# Patient Record
Sex: FEMALE | Race: WHITE | NOT HISPANIC OR LATINO | Employment: OTHER | URBAN - METROPOLITAN AREA
[De-identification: names, ages, dates, MRNs, and addresses within clinical notes are randomized per-mention and may not be internally consistent; named-entity substitution may affect disease eponyms.]

---

## 2024-02-29 ENCOUNTER — HOSPITAL ENCOUNTER (EMERGENCY)
Facility: HOSPITAL | Age: 79
Discharge: HOME | End: 2024-02-29
Attending: EMERGENCY MEDICINE
Payer: MEDICARE

## 2024-02-29 ENCOUNTER — APPOINTMENT (OUTPATIENT)
Dept: RADIOLOGY | Facility: HOSPITAL | Age: 79
End: 2024-02-29
Payer: MEDICARE

## 2024-02-29 VITALS
RESPIRATION RATE: 15 BRPM | BODY MASS INDEX: 25.69 KG/M2 | OXYGEN SATURATION: 96 % | DIASTOLIC BLOOD PRESSURE: 59 MMHG | WEIGHT: 145 LBS | TEMPERATURE: 98.7 F | SYSTOLIC BLOOD PRESSURE: 118 MMHG | HEIGHT: 63 IN | HEART RATE: 93 BPM

## 2024-02-29 DIAGNOSIS — R60.9 PERIPHERAL EDEMA: Primary | ICD-10-CM

## 2024-02-29 DIAGNOSIS — R60.0 PERIPHERAL EDEMA: Primary | ICD-10-CM

## 2024-02-29 LAB
ALBUMIN SERPL BCP-MCNC: 3.6 G/DL (ref 3.4–5)
ALP SERPL-CCNC: 138 U/L (ref 33–136)
ALT SERPL W P-5'-P-CCNC: 21 U/L (ref 7–45)
ANION GAP SERPL CALC-SCNC: 13 MMOL/L (ref 10–20)
AST SERPL W P-5'-P-CCNC: 19 U/L (ref 9–39)
BASOPHILS # BLD AUTO: 0.03 X10*3/UL (ref 0–0.1)
BASOPHILS NFR BLD AUTO: 0.4 %
BILIRUB DIRECT SERPL-MCNC: 0.1 MG/DL (ref 0–0.3)
BILIRUB SERPL-MCNC: 0.3 MG/DL (ref 0–1.2)
BNP SERPL-MCNC: 150 PG/ML (ref 0–99)
BUN SERPL-MCNC: 26 MG/DL (ref 6–23)
CALCIUM SERPL-MCNC: 9.1 MG/DL (ref 8.6–10.3)
CHLORIDE SERPL-SCNC: 106 MMOL/L (ref 98–107)
CO2 SERPL-SCNC: 26 MMOL/L (ref 21–32)
CREAT SERPL-MCNC: 0.97 MG/DL (ref 0.5–1.05)
EGFRCR SERPLBLD CKD-EPI 2021: 60 ML/MIN/1.73M*2
EOSINOPHIL # BLD AUTO: 0.17 X10*3/UL (ref 0–0.4)
EOSINOPHIL NFR BLD AUTO: 2.3 %
ERYTHROCYTE [DISTWIDTH] IN BLOOD BY AUTOMATED COUNT: 14 % (ref 11.5–14.5)
GLUCOSE SERPL-MCNC: 95 MG/DL (ref 74–99)
HCT VFR BLD AUTO: 38.2 % (ref 36–46)
HGB BLD-MCNC: 12.1 G/DL (ref 12–16)
IMM GRANULOCYTES # BLD AUTO: 0.01 X10*3/UL (ref 0–0.5)
IMM GRANULOCYTES NFR BLD AUTO: 0.1 % (ref 0–0.9)
INR PPP: 1.2 (ref 0.9–1.1)
LYMPHOCYTES # BLD AUTO: 2.58 X10*3/UL (ref 0.8–3)
LYMPHOCYTES NFR BLD AUTO: 35.2 %
MCH RBC QN AUTO: 29.1 PG (ref 26–34)
MCHC RBC AUTO-ENTMCNC: 31.7 G/DL (ref 32–36)
MCV RBC AUTO: 92 FL (ref 80–100)
MONOCYTES # BLD AUTO: 0.55 X10*3/UL (ref 0.05–0.8)
MONOCYTES NFR BLD AUTO: 7.5 %
NEUTROPHILS # BLD AUTO: 3.99 X10*3/UL (ref 1.6–5.5)
NEUTROPHILS NFR BLD AUTO: 54.5 %
NRBC BLD-RTO: 0 /100 WBCS (ref 0–0)
PLATELET # BLD AUTO: 308 X10*3/UL (ref 150–450)
POTASSIUM SERPL-SCNC: 4.4 MMOL/L (ref 3.5–5.3)
PROT SERPL-MCNC: 6.8 G/DL (ref 6.4–8.2)
PROTHROMBIN TIME: 13.8 SECONDS (ref 9.8–12.8)
RBC # BLD AUTO: 4.16 X10*6/UL (ref 4–5.2)
SODIUM SERPL-SCNC: 141 MMOL/L (ref 136–145)
WBC # BLD AUTO: 7.3 X10*3/UL (ref 4.4–11.3)

## 2024-02-29 PROCEDURE — 36415 COLL VENOUS BLD VENIPUNCTURE: CPT | Performed by: EMERGENCY MEDICINE

## 2024-02-29 PROCEDURE — 93971 EXTREMITY STUDY: CPT | Performed by: RADIOLOGY

## 2024-02-29 PROCEDURE — 93971 EXTREMITY STUDY: CPT

## 2024-02-29 PROCEDURE — 99284 EMERGENCY DEPT VISIT MOD MDM: CPT | Mod: 25

## 2024-02-29 PROCEDURE — 85610 PROTHROMBIN TIME: CPT | Performed by: EMERGENCY MEDICINE

## 2024-02-29 PROCEDURE — 82248 BILIRUBIN DIRECT: CPT | Performed by: EMERGENCY MEDICINE

## 2024-02-29 PROCEDURE — 85025 COMPLETE CBC W/AUTO DIFF WBC: CPT | Performed by: EMERGENCY MEDICINE

## 2024-02-29 PROCEDURE — 83880 ASSAY OF NATRIURETIC PEPTIDE: CPT | Performed by: EMERGENCY MEDICINE

## 2024-02-29 ASSESSMENT — COLUMBIA-SUICIDE SEVERITY RATING SCALE - C-SSRS
1. IN THE PAST MONTH, HAVE YOU WISHED YOU WERE DEAD OR WISHED YOU COULD GO TO SLEEP AND NOT WAKE UP?: NO
6. HAVE YOU EVER DONE ANYTHING, STARTED TO DO ANYTHING, OR PREPARED TO DO ANYTHING TO END YOUR LIFE?: NO
2. HAVE YOU ACTUALLY HAD ANY THOUGHTS OF KILLING YOURSELF?: NO

## 2024-02-29 ASSESSMENT — PAIN - FUNCTIONAL ASSESSMENT: PAIN_FUNCTIONAL_ASSESSMENT: 0-10

## 2024-02-29 ASSESSMENT — ACTIVITIES OF DAILY LIVING (ADL): LACK_OF_TRANSPORTATION: NO

## 2024-02-29 NOTE — PROGRESS NOTES
Transitional Care Coordination Progress Note:  Plan per Medical/Surgical team: treatment of leg swelling with US of leg swelling  Status: ED  Payor source: medicare A/B  Discharge disposition: With  @ [facility] in [place], private Aide M/W/F 5 hours a day  PCP Dr Marquez in [place]  Potential Barriers: hx of CVA-uses electric WC  ADOD: 2/29/2024  LORELEI Bañuelos RN, BSN Transitional Care Coordinator ED# [phone]      02/29/24 1136   Discharge Planning   Living Arrangements Spouse/significant other   Support Systems Spouse/significant other;Children   Assistance Needed US of leg pending   Type of Residence Assisted living   Do you have animals or pets at home? No   Care Facility Name With  @ [facility] in [place], private Aide M/W/F 5 hours a day   Home or Post Acute Services In home services   Type of Post Acute Facility Services Assisted living   Type of Home Care Services Home health aide   Patient expects to be discharged to: With  @ [facility] in [place], private Aide M/W/F 5 hours a day   Does the patient need discharge transport arranged? Yes   RoundTrip coordination needed? Yes   Has discharge transport been arranged? No   Financial Resource Strain   How hard is it for you to pay for the very basics like food, housing, medical care, and heating? Not hard   Housing Stability   In the last 12 months, was there a time when you were not able to pay the mortgage or rent on time? N   In the last 12 months, how many places have you lived? 2   In the last 12 months, was there a time when you did not have a steady place to sleep or slept in a shelter (including now)? N   Transportation Needs   In the past 12 months, has lack of transportation kept you from medical appointments or from getting medications? no   In the past 12 months, has lack of transportation kept you from meetings, work, or from getting things needed for daily living? No

## 2024-02-29 NOTE — CARE PLAN
Pt  said she  did not  know  how  she   was    going  to    get   home.     SW  spoke with pt - she  said that her   was in another  room in the ED and had  the cell phone/phone  numbers. SW went to spouse - he  was  dc. He  was  accompanied by this   writer  to the pt's   room   where  she is  dc. Pt and spouse  agreed that they  can    call family  to  get  a  ride.     Trixie GRNAT

## 2024-02-29 NOTE — ED PROVIDER NOTES
HPI   Chief Complaint   Patient presents with    Leg Pain       HPI: []  78-year-old white female history of hypertension, CVA with left-sided weakness on Coumadin comes in with left leg swelling.  She states he wears a brace she does ambulate with a walker and also an electric wheelchair and for the last few days she noticed left leg swelling.  No trauma no falls.  No chest pain pressure heaviness or trouble breathing.  No hip pain or knee pain.  She denies recent travel hospitalization or antibiotics.  She denies any abdominal pain nausea diarrhea fever chills cough congestion incontinence seizures syncope or near syncope.    Past history: Hypertension, CVA, CAD, breast cancer in remission  Social: Patient denies current tobacco alcohol drug abuse.  REVIEW OF SYSTEMS:    GENERAL.: No weight loss, fatigue, anorexia, insomnia, fever.    EYES: No vision loss, double vision, drainage, eye pain.    ENT: No pharyngitis, dry mouth.    CARDIOPULMONARY: No chest pain, palpitations, syncope, near syncope. No shortness of breath, cough, hemoptysis.    GI: No abdominal pain, change in bowel habits, melena, hematemesis, hematochezia, nausea, vomiting, diarrhea.    : No discharge, dysuria, frequency, urgency, hematuria.    MS: No limb pain, joint pain, joint swelling.  Positive left lower extremity swelling    SKIN: No rashes.    PSYCH: No depression, anxiety, suicidality, homicidality.    Review of systems is otherwise negative unless stated above or in history of present illness.  Social history, family history, allergies reviewed.  PHYSICAL EXAM:    GENERAL: Vitals noted, no distress. Alert and oriented  x 3. Non-toxic.      EENT: TMs clear. Posterior oropharynx unremarkable. No meningismus. No LAD.     NECK: Supple. Nontender. No midline tenderness.     CARDIAC: Regular, rate, rhythm.  Grade 2 x 6 ejection systolic murmur best at the left lower sternal border, no rubs or gallops. No JVD    PULMONARY: Lungs clear  bilaterally with good aeration. No wheezes rales or rhonchi. No respiratory distress.  No tachypnea stridor or retractions able to speak in full sentences    ABDOMEN: Soft, nonsurgical. Nontender. No peritoneal signs. Normoactive bowel sounds. No pulsatile masses.     EXTREMITIES: Right lower extremity has no peripheral edema. Negative Homans bilaterally, no cords.  2+ bounding pulses well-perfused, left lower extremity was immobilized in a splint brace which I took off patient has 1+ pitting edema 2+ bounding pulses no obvious deformity no swelling of the knee joint or ankle joint good range of motion of the hip knee and ankle bounding pulses neurovascular intact no erythema redness ecchymosis or bruising.    SKIN: No rash. Intact.     NEURO: Patient at her baseline neurologic status with no new focal neurologic deficits.    MEDICAL DECISION MAKING:  CBC shows no leukocytosis stable hemoglobin chemistry LFTs are unremarkable INR 1.2 subtherapeutic ultrasound left lower extremity negative for DVT.    Treatment in ED: None    ED course: Patient remains asymptomatic remains afebrile normotensive no tachycardia or hypoxia.    Impression: #1 left lower extremity swelling  MDM/plan: Elderly female 78-year-old history of CVA in the past on Coumadin presents with atraumatic left lower remedy swelling she does have asymmetric edema 1+ pitting edema left lower extremity bounding pulses low concern for an cellulitis neurovascular compromise joint effusion or arthritis septic arthritis or DVT which have been ruled out low concern for compartment syndrome or necrotizing fasciitis, patient be discharged home advised supportive care compression stockings leg elevation made aware of subtherapeutic INR advised to reach out to her primary doctor for further advice regarding Coumadin dosage with strict return precaution.                          [place] Coma Scale Score: 15                     Patient History   No past medical  history on file.  No past surgical history on file.  No family history on file.  Social History     Tobacco Use    Smoking status: Not on file    Smokeless tobacco: Not on file   Substance Use Topics    Alcohol use: Not on file    Drug use: Not on file       Physical Exam   ED Triage Vitals   Temperature Heart Rate Respirations BP   02/29/24 1114 02/29/24 1114 02/29/24 1114 02/29/24 1114   37.1 °C (98.7 °F) 96 18 138/56      Pulse Ox Temp Source Heart Rate Source Patient Position   02/29/24 1114 02/29/24 1114 02/29/24 1400 --   96 % Temporal Monitor       BP Location FiO2 (%)     02/29/24 1114 --     Left arm        Physical Exam    ED Course & MDM   ED Course as of 02/29/24 1504   Thu Feb 29, 2024   1349 Patient CBC with differential is unremarkable no leukocytosis chemistries unremarkable,  ultrasound lower extremity negative DVT.  On my exam patient is comfortable afebrile normotensive left lower extremity has 1+ pitting edema but soft compartments good range of motion of the knee ankle and hip low concern for compartment syndrome and or hematoma and/or infection or cellulitis or necrotizing fasciitis, patient be discharged home with supportive care advised to wear compression stockings leg elevation her INR is low, patient made aware advised to reach out to primary doctor for further advice with strict return precautions. [MT]      ED Course User Index  [MT] Hilton Pinon MD         Diagnoses as of 02/29/24 1504   Peripheral edema       Medical Decision Making      Procedure  Procedures     Hilton Pinon MD  02/29/24 1508

## 2024-02-29 NOTE — PROGRESS NOTES
With  @ [facility] Aide M/W/F 5 hours a day     02/29/24 1131   Current Planned Discharge Disposition   Current Planned Discharge Disposition Home

## 2024-02-29 NOTE — PROGRESS NOTES
02/29/24 1131   ACS Disability Status   Are you deaf or do you have serious difficulty hearing? N   Are you blind or do you have serious difficulty seeing, even when wearing glasses? N   Because of a physical, mental, or emotional condition, do you have serious difficulty concentrating, remembering, or making decisions? (5 years old or older) N   Do you have serious difficulty walking or climbing stairs? Y  (CVA, left leg brace, walker, electric WC)   Do you have serious difficulty dressing or bathing? N   Because of a physical, mental, or emotional condition, do you have serious difficulty doing errands alone such as visiting the doctor? Y  (aide drives couple to appointments)

## 2024-02-29 NOTE — ED TRIAGE NOTES
Pt arrived via EMS from [facility] with chief c/o of L LE pain/redness/swelling x3 days. Pt denies any known injury to site. EMS does report that they helped her off the floor a few weeks ago but they are unsure if they had any cuts. Pt has hx of stroke with residual aphasia. She states she uses mechanical wheelchair at home.

## 2024-07-03 ENCOUNTER — HOSPITAL ENCOUNTER (OUTPATIENT)
Facility: HOSPITAL | Age: 79
Setting detail: OBSERVATION
Discharge: HOME | End: 2024-07-04
Attending: EMERGENCY MEDICINE | Admitting: INTERNAL MEDICINE
Payer: MEDICARE

## 2024-07-03 DIAGNOSIS — S01.81XA LACERATION OF FOREHEAD, INITIAL ENCOUNTER: ICD-10-CM
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
## 2024-07-03 DIAGNOSIS — F01.50 VASCULAR DEMENTIA, UNSPECIFIED DEMENTIA SEVERITY, UNSPECIFIED WHETHER BEHAVIORAL, PSYCHOTIC, OR MOOD DISTURBANCE OR ANXIETY (MULTI): ICD-10-CM

## 2024-07-03 DIAGNOSIS — S09.90XA HEAD INJURY, INITIAL ENCOUNTER: ICD-10-CM

## 2024-07-03 PROCEDURE — 99285 EMERGENCY DEPT VISIT HI MDM: CPT | Mod: 25

## 2024-07-03 ASSESSMENT — PAIN DESCRIPTION - LOCATION: LOCATION: HEAD

## 2024-07-03 ASSESSMENT — PAIN - FUNCTIONAL ASSESSMENT: PAIN_FUNCTIONAL_ASSESSMENT: 0-10

## 2024-07-03 ASSESSMENT — PAIN DESCRIPTION - PROGRESSION: CLINICAL_PROGRESSION: NOT CHANGED

## 2024-07-03 ASSESSMENT — PAIN DESCRIPTION - PAIN TYPE: TYPE: ACUTE PAIN

## 2024-07-03 ASSESSMENT — PAIN SCALES - GENERAL: PAINLEVEL_OUTOF10: 3

## 2024-07-04 ENCOUNTER — APPOINTMENT (OUTPATIENT)
Dept: RADIOLOGY | Facility: HOSPITAL | Age: 79
End: 2024-07-04
Payer: MEDICARE

## 2024-07-04 VITALS
RESPIRATION RATE: 16 BRPM | OXYGEN SATURATION: 95 % | BODY MASS INDEX: 22.32 KG/M2 | HEIGHT: 63 IN | HEART RATE: 85 BPM | WEIGHT: 126 LBS | SYSTOLIC BLOOD PRESSURE: 142 MMHG | DIASTOLIC BLOOD PRESSURE: 81 MMHG | TEMPERATURE: 98.1 F

## 2024-07-04 PROBLEM — I35.0 AORTIC STENOSIS: Status: ACTIVE | Noted: 2024-07-04

## 2024-07-04 PROBLEM — Z86.73 HISTORY OF STROKE: Status: ACTIVE | Noted: 2024-07-04

## 2024-07-04 PROBLEM — G40.909 SEIZURE DISORDER (MULTI): Status: ACTIVE | Noted: 2024-07-04

## 2024-07-04 PROBLEM — R47.01 EXPRESSIVE APHASIA: Status: ACTIVE | Noted: 2024-07-04

## 2024-07-04 PROBLEM — Z86.718 HISTORY OF DVT (DEEP VEIN THROMBOSIS): Status: ACTIVE | Noted: 2024-07-04

## 2024-07-04 PROBLEM — F01.50 VASCULAR DEMENTIA (MULTI): Status: ACTIVE | Noted: 2024-07-04

## 2024-07-04 PROBLEM — Z86.79 HISTORY OF SUBDURAL HEMATOMA: Status: ACTIVE | Noted: 2024-07-04

## 2024-07-04 PROBLEM — F31.9 BIPOLAR DISORDER (MULTI): Status: ACTIVE | Noted: 2024-07-04
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
## 2024-07-04 PROBLEM — I73.9 PAD (PERIPHERAL ARTERY DISEASE) (CMS-HCC): Status: ACTIVE | Noted: 2024-07-04

## 2024-07-04 LAB
ALBUMIN SERPL BCP-MCNC: 3.7 G/DL (ref 3.4–5)
ALP SERPL-CCNC: 109 U/L (ref 33–136)
ALT SERPL W P-5'-P-CCNC: 20 U/L (ref 7–45)
ANION GAP SERPL CALC-SCNC: 15 MMOL/L (ref 10–20)
APPEARANCE UR: CLEAR
APTT PPP: 38 SECONDS (ref 27–38)
AST SERPL W P-5'-P-CCNC: 21 U/L (ref 9–39)
BASOPHILS # BLD AUTO: 0.03 X10*3/UL (ref 0–0.1)
BASOPHILS NFR BLD AUTO: 0.4 %
BILIRUB SERPL-MCNC: 0.2 MG/DL (ref 0–1.2)
BILIRUB UR STRIP.AUTO-MCNC: NEGATIVE MG/DL
BNP SERPL-MCNC: 100 PG/ML (ref 0–99)
BUN SERPL-MCNC: 21 MG/DL (ref 6–23)
CALCIUM SERPL-MCNC: 9.2 MG/DL (ref 8.6–10.3)
CHLORIDE SERPL-SCNC: 106 MMOL/L (ref 98–107)
CHOLEST SERPL-MCNC: 211 MG/DL (ref 0–199)
CHOLESTEROL/HDL RATIO: 4
CO2 SERPL-SCNC: 25 MMOL/L (ref 21–32)
COLOR UR: NORMAL
CREAT SERPL-MCNC: 0.94 MG/DL (ref 0.5–1.05)
EGFRCR SERPLBLD CKD-EPI 2021: 62 ML/MIN/1.73M*2
EOSINOPHIL # BLD AUTO: 0.11 X10*3/UL (ref 0–0.4)
EOSINOPHIL NFR BLD AUTO: 1.6 %
ERYTHROCYTE [DISTWIDTH] IN BLOOD BY AUTOMATED COUNT: 16.2 % (ref 11.5–14.5)
EST. AVERAGE GLUCOSE BLD GHB EST-MCNC: 108 MG/DL
GLUCOSE BLD MANUAL STRIP-MCNC: 104 MG/DL (ref 74–99)
GLUCOSE SERPL-MCNC: 118 MG/DL (ref 74–99)
GLUCOSE UR STRIP.AUTO-MCNC: NORMAL MG/DL
HBA1C MFR BLD: 5.4 %
HCT VFR BLD AUTO: 40.5 % (ref 36–46)
HDLC SERPL-MCNC: 52.6 MG/DL
HGB BLD-MCNC: 12.4 G/DL (ref 12–16)
IMM GRANULOCYTES # BLD AUTO: 0.02 X10*3/UL (ref 0–0.5)
IMM GRANULOCYTES NFR BLD AUTO: 0.3 % (ref 0–0.9)
INR PPP: 2.4 (ref 0.9–1.1)
KETONES UR STRIP.AUTO-MCNC: NEGATIVE MG/DL
LAMOTRIGINE SERPL-MCNC: 1.4 UG/ML (ref 2.5–15)
LDLC SERPL CALC-MCNC: 132 MG/DL
LEUKOCYTE ESTERASE UR QL STRIP.AUTO: NEGATIVE
LEVETIRACETAM SERPL-MCNC: <2 UG/ML (ref 10–40)
LYMPHOCYTES # BLD AUTO: 2.54 X10*3/UL (ref 0.8–3)
LYMPHOCYTES NFR BLD AUTO: 37.3 %
MCH RBC QN AUTO: 25.9 PG (ref 26–34)
MCHC RBC AUTO-ENTMCNC: 30.6 G/DL (ref 32–36)
MCV RBC AUTO: 85 FL (ref 80–100)
MONOCYTES # BLD AUTO: 0.67 X10*3/UL (ref 0.05–0.8)
MONOCYTES NFR BLD AUTO: 9.8 %
NEUTROPHILS # BLD AUTO: 3.44 X10*3/UL (ref 1.6–5.5)
NEUTROPHILS NFR BLD AUTO: 50.6 %
NITRITE UR QL STRIP.AUTO: NEGATIVE
NON HDL CHOLESTEROL: 158 MG/DL (ref 0–149)
NRBC BLD-RTO: 0 /100 WBCS (ref 0–0)
PH UR STRIP.AUTO: 7.5 [PH]
PLATELET # BLD AUTO: 287 X10*3/UL (ref 150–450)
POTASSIUM SERPL-SCNC: 4.2 MMOL/L (ref 3.5–5.3)
PROT SERPL-MCNC: 6.5 G/DL (ref 6.4–8.2)
PROT UR STRIP.AUTO-MCNC: NEGATIVE MG/DL
PROTHROMBIN TIME: 27.2 SECONDS (ref 9.8–12.8)
RBC # BLD AUTO: 4.79 X10*6/UL (ref 4–5.2)
RBC # UR STRIP.AUTO: NEGATIVE /UL
SODIUM SERPL-SCNC: 142 MMOL/L (ref 136–145)
SP GR UR STRIP.AUTO: 1.02
T4 FREE SERPL-MCNC: 0.85 NG/DL (ref 0.61–1.12)
TRIGL SERPL-MCNC: 131 MG/DL (ref 0–149)
TSH SERPL-ACNC: 2.68 MIU/L (ref 0.44–3.98)
UROBILINOGEN UR STRIP.AUTO-MCNC: NORMAL MG/DL
VLDL: 26 MG/DL (ref 0–40)
WBC # BLD AUTO: 6.8 X10*3/UL (ref 4.4–11.3)

## 2024-07-04 PROCEDURE — 85610 PROTHROMBIN TIME: CPT | Performed by: EMERGENCY MEDICINE

## 2024-07-04 PROCEDURE — 36415 COLL VENOUS BLD VENIPUNCTURE: CPT | Performed by: EMERGENCY MEDICINE

## 2024-07-04 PROCEDURE — 84443 ASSAY THYROID STIM HORMONE: CPT | Performed by: INTERNAL MEDICINE

## 2024-07-04 PROCEDURE — 70551 MRI BRAIN STEM W/O DYE: CPT | Performed by: STUDENT IN AN ORGANIZED HEALTH CARE EDUCATION/TRAINING PROGRAM

## 2024-07-04 PROCEDURE — 84439 ASSAY OF FREE THYROXINE: CPT | Performed by: INTERNAL MEDICINE

## 2024-07-04 PROCEDURE — 72125 CT NECK SPINE W/O DYE: CPT | Performed by: STUDENT IN AN ORGANIZED HEALTH CARE EDUCATION/TRAINING PROGRAM

## 2024-07-04 PROCEDURE — 70547 MR ANGIOGRAPHY NECK W/O DYE: CPT

## 2024-07-04 PROCEDURE — 72125 CT NECK SPINE W/O DYE: CPT

## 2024-07-04 PROCEDURE — 70544 MR ANGIOGRAPHY HEAD W/O DYE: CPT

## 2024-07-04 PROCEDURE — 80177 DRUG SCRN QUAN LEVETIRACETAM: CPT | Mod: AHULAB | Performed by: INTERNAL MEDICINE

## 2024-07-04 PROCEDURE — G0378 HOSPITAL OBSERVATION PER HR: HCPCS

## 2024-07-04 PROCEDURE — 73502 X-RAY EXAM HIP UNI 2-3 VIEWS: CPT | Mod: LT

## 2024-07-04 PROCEDURE — 80053 COMPREHEN METABOLIC PANEL: CPT | Performed by: EMERGENCY MEDICINE

## 2024-07-04 PROCEDURE — 83036 HEMOGLOBIN GLYCOSYLATED A1C: CPT | Mod: AHULAB | Performed by: INTERNAL MEDICINE

## 2024-07-04 PROCEDURE — 81003 URINALYSIS AUTO W/O SCOPE: CPT | Performed by: EMERGENCY MEDICINE

## 2024-07-04 PROCEDURE — 2500000004 HC RX 250 GENERAL PHARMACY W/ HCPCS (ALT 636 FOR OP/ED): Performed by: EMERGENCY MEDICINE

## 2024-07-04 PROCEDURE — 80061 LIPID PANEL: CPT | Performed by: INTERNAL MEDICINE

## 2024-07-04 PROCEDURE — 36415 COLL VENOUS BLD VENIPUNCTURE: CPT | Performed by: INTERNAL MEDICINE

## 2024-07-04 PROCEDURE — 80175 DRUG SCREEN QUAN LAMOTRIGINE: CPT | Mod: AHULAB | Performed by: INTERNAL MEDICINE

## 2024-07-04 PROCEDURE — 2500000001 HC RX 250 WO HCPCS SELF ADMINISTERED DRUGS (ALT 637 FOR MEDICARE OP): Performed by: EMERGENCY MEDICINE

## 2024-07-04 PROCEDURE — 90715 TDAP VACCINE 7 YRS/> IM: CPT | Performed by: EMERGENCY MEDICINE

## 2024-07-04 PROCEDURE — 70547 MR ANGIOGRAPHY NECK W/O DYE: CPT | Performed by: STUDENT IN AN ORGANIZED HEALTH CARE EDUCATION/TRAINING PROGRAM

## 2024-07-04 PROCEDURE — 70450 CT HEAD/BRAIN W/O DYE: CPT

## 2024-07-04 PROCEDURE — 70450 CT HEAD/BRAIN W/O DYE: CPT | Performed by: RADIOLOGY

## 2024-07-04 PROCEDURE — 70450 CT HEAD/BRAIN W/O DYE: CPT | Mod: 59

## 2024-07-04 PROCEDURE — 2500000001 HC RX 250 WO HCPCS SELF ADMINISTERED DRUGS (ALT 637 FOR MEDICARE OP): Performed by: INTERNAL MEDICINE

## 2024-07-04 PROCEDURE — 85025 COMPLETE CBC W/AUTO DIFF WBC: CPT | Performed by: EMERGENCY MEDICINE

## 2024-07-04 PROCEDURE — 82947 ASSAY GLUCOSE BLOOD QUANT: CPT

## 2024-07-04 PROCEDURE — 90471 IMMUNIZATION ADMIN: CPT | Performed by: EMERGENCY MEDICINE

## 2024-07-04 PROCEDURE — 70450 CT HEAD/BRAIN W/O DYE: CPT | Performed by: STUDENT IN AN ORGANIZED HEALTH CARE EDUCATION/TRAINING PROGRAM

## 2024-07-04 PROCEDURE — 70551 MRI BRAIN STEM W/O DYE: CPT

## 2024-07-04 PROCEDURE — 73502 X-RAY EXAM HIP UNI 2-3 VIEWS: CPT | Mod: LEFT SIDE | Performed by: STUDENT IN AN ORGANIZED HEALTH CARE EDUCATION/TRAINING PROGRAM

## 2024-07-04 PROCEDURE — 83880 ASSAY OF NATRIURETIC PEPTIDE: CPT | Performed by: INTERNAL MEDICINE

## 2024-07-04 PROCEDURE — 99235 HOSP IP/OBS SAME DATE MOD 70: CPT | Performed by: INTERNAL MEDICINE

## 2024-07-04 RX ORDER — ACETAMINOPHEN 325 MG/1
975 TABLET ORAL ONCE
Status: COMPLETED | OUTPATIENT
Start: 2024-07-04 | End: 2024-07-04

## 2024-07-04 RX ORDER — LEVETIRACETAM 250 MG/1
250 TABLET ORAL DAILY
COMMUNITY

## 2024-07-04 RX ORDER — DULOXETIN HYDROCHLORIDE 30 MG/1
30 CAPSULE, DELAYED RELEASE ORAL DAILY
COMMUNITY

## 2024-07-04 RX ORDER — ATORVASTATIN CALCIUM 40 MG/1
40 TABLET, FILM COATED ORAL NIGHTLY
Status: DISCONTINUED | OUTPATIENT
Start: 2024-07-04 | End: 2024-07-04 | Stop reason: HOSPADM

## 2024-07-04 RX ORDER — WARFARIN 2 MG/1
3 TABLET ORAL
COMMUNITY

## 2024-07-04 RX ORDER — LEVETIRACETAM 250 MG/1
250 TABLET ORAL DAILY
Status: DISCONTINUED | OUTPATIENT
Start: 2024-07-04 | End: 2024-07-04 | Stop reason: HOSPADM

## 2024-07-04 RX ORDER — ACETAMINOPHEN 325 MG/1
975 TABLET ORAL EVERY 8 HOURS PRN
Status: DISCONTINUED | OUTPATIENT
Start: 2024-07-04 | End: 2024-07-04 | Stop reason: HOSPADM

## 2024-07-04 RX ORDER — MIRTAZAPINE 15 MG/1
15 TABLET, FILM COATED ORAL NIGHTLY PRN
COMMUNITY

## 2024-07-04 RX ORDER — DULOXETIN HYDROCHLORIDE 30 MG/1
30 CAPSULE, DELAYED RELEASE ORAL DAILY
Status: DISCONTINUED | OUTPATIENT
Start: 2024-07-04 | End: 2024-07-04 | Stop reason: HOSPADM

## 2024-07-04 RX ORDER — ACETAMINOPHEN 160 MG/5ML
650 SOLUTION ORAL EVERY 6 HOURS PRN
Status: DISCONTINUED | OUTPATIENT
Start: 2024-07-04 | End: 2024-07-04 | Stop reason: HOSPADM

## 2024-07-04 RX ORDER — POLYETHYLENE GLYCOL 3350 17 G/17G
17 POWDER, FOR SOLUTION ORAL DAILY
Status: DISCONTINUED | OUTPATIENT
Start: 2024-07-04 | End: 2024-07-04 | Stop reason: HOSPADM

## 2024-07-04 RX ORDER — ONDANSETRON HYDROCHLORIDE 2 MG/ML
4 INJECTION, SOLUTION INTRAVENOUS EVERY 8 HOURS PRN
Status: DISCONTINUED | OUTPATIENT
Start: 2024-07-04 | End: 2024-07-04 | Stop reason: HOSPADM

## 2024-07-04 RX ORDER — ONDANSETRON 4 MG/1
4 TABLET, FILM COATED ORAL EVERY 8 HOURS PRN
Status: DISCONTINUED | OUTPATIENT
Start: 2024-07-04 | End: 2024-07-04 | Stop reason: HOSPADM

## 2024-07-04 RX ORDER — ACETAMINOPHEN 325 MG/1
650 TABLET ORAL EVERY 6 HOURS PRN
COMMUNITY

## 2024-07-04 RX ORDER — ATORVASTATIN CALCIUM 40 MG/1
40 TABLET, FILM COATED ORAL NIGHTLY
Qty: 30 TABLET | Refills: 0 | Status: SHIPPED | OUTPATIENT
Start: 2024-07-04 | End: 2024-08-03

## 2024-07-04 RX ORDER — LAMOTRIGINE 150 MG/1
150 TABLET ORAL DAILY
COMMUNITY

## 2024-07-04 RX ORDER — LABETALOL HYDROCHLORIDE 5 MG/ML
10 INJECTION, SOLUTION INTRAVENOUS EVERY 10 MIN PRN
Status: DISCONTINUED | OUTPATIENT
Start: 2024-07-04 | End: 2024-07-04 | Stop reason: HOSPADM

## 2024-07-04 RX ORDER — TALC
6 POWDER (GRAM) TOPICAL NIGHTLY
Status: DISCONTINUED | OUTPATIENT
Start: 2024-07-04 | End: 2024-07-04 | Stop reason: HOSPADM

## 2024-07-04 RX ORDER — WARFARIN 3 MG/1
3 TABLET ORAL
Status: DISCONTINUED | OUTPATIENT
Start: 2024-07-04 | End: 2024-07-04 | Stop reason: HOSPADM

## 2024-07-04 RX ORDER — WARFARIN 2 MG/1
2 TABLET ORAL 3 TIMES WEEKLY
COMMUNITY

## 2024-07-04 RX ORDER — ACETAMINOPHEN 650 MG/1
650 SUPPOSITORY RECTAL EVERY 4 HOURS PRN
Status: DISCONTINUED | OUTPATIENT
Start: 2024-07-04 | End: 2024-07-04 | Stop reason: HOSPADM

## 2024-07-04 RX ORDER — PANTOPRAZOLE SODIUM 40 MG/1
40 TABLET, DELAYED RELEASE ORAL
Status: DISCONTINUED | OUTPATIENT
Start: 2024-07-05 | End: 2024-07-04 | Stop reason: HOSPADM

## 2024-07-04 RX ORDER — WARFARIN 2 MG/1
2 TABLET ORAL DAILY
Status: DISCONTINUED | OUTPATIENT
Start: 2024-07-04 | End: 2024-07-04 | Stop reason: DRUGHIGH

## 2024-07-04 RX ORDER — ASPIRIN 81 MG/1
81 TABLET ORAL DAILY
Status: DISCONTINUED | OUTPATIENT
Start: 2024-07-04 | End: 2024-07-04 | Stop reason: HOSPADM

## 2024-07-04 RX ORDER — HYDRALAZINE HYDROCHLORIDE 25 MG/1
25 TABLET, FILM COATED ORAL EVERY 6 HOURS PRN
Status: DISCONTINUED | OUTPATIENT
Start: 2024-07-06 | End: 2024-07-04 | Stop reason: HOSPADM

## 2024-07-04 RX ORDER — WARFARIN 2 MG/1
2 TABLET ORAL
Status: DISCONTINUED | OUTPATIENT
Start: 2024-07-05 | End: 2024-07-04 | Stop reason: HOSPADM

## 2024-07-04 RX ORDER — MIRTAZAPINE 15 MG/1
15 TABLET, FILM COATED ORAL NIGHTLY
Status: DISCONTINUED | OUTPATIENT
Start: 2024-07-04 | End: 2024-07-04 | Stop reason: HOSPADM

## 2024-07-04 RX ORDER — HYDRALAZINE HYDROCHLORIDE 20 MG/ML
10 INJECTION INTRAMUSCULAR; INTRAVENOUS
Status: DISCONTINUED | OUTPATIENT
Start: 2024-07-04 | End: 2024-07-04 | Stop reason: HOSPADM

## 2024-07-04 SDOH — SOCIAL STABILITY: SOCIAL INSECURITY: DO YOU FEEL UNSAFE GOING BACK TO THE PLACE WHERE YOU ARE LIVING?: NO

## 2024-07-04 SDOH — SOCIAL STABILITY: SOCIAL INSECURITY: ARE YOU OR HAVE YOU BEEN THREATENED OR ABUSED PHYSICALLY, EMOTIONALLY, OR SEXUALLY BY ANYONE?: NO

## 2024-07-04 SDOH — SOCIAL STABILITY: SOCIAL INSECURITY: ABUSE: ADULT

## 2024-07-04 SDOH — SOCIAL STABILITY: SOCIAL INSECURITY: ARE THERE ANY APPARENT SIGNS OF INJURIES/BEHAVIORS THAT COULD BE RELATED TO ABUSE/NEGLECT?: NO

## 2024-07-04 SDOH — SOCIAL STABILITY: SOCIAL INSECURITY: DOES ANYONE TRY TO KEEP YOU FROM HAVING/CONTACTING OTHER FRIENDS OR DOING THINGS OUTSIDE YOUR HOME?: NO

## 2024-07-04 SDOH — SOCIAL STABILITY: SOCIAL INSECURITY: DO YOU FEEL ANYONE HAS EXPLOITED OR TAKEN ADVANTAGE OF YOU FINANCIALLY OR OF YOUR PERSONAL PROPERTY?: NO

## 2024-07-04 SDOH — SOCIAL STABILITY: SOCIAL INSECURITY: WERE YOU ABLE TO COMPLETE ALL THE BEHAVIORAL HEALTH SCREENINGS?: YES

## 2024-07-04 SDOH — SOCIAL STABILITY: SOCIAL INSECURITY: HAVE YOU HAD THOUGHTS OF HARMING ANYONE ELSE?: NO

## 2024-07-04 SDOH — SOCIAL STABILITY: SOCIAL INSECURITY: HAS ANYONE EVER THREATENED TO HURT YOUR FAMILY OR YOUR PETS?: NO

## 2024-07-04 SDOH — SOCIAL STABILITY: SOCIAL INSECURITY: HAVE YOU HAD ANY THOUGHTS OF HARMING ANYONE ELSE?: NO

## 2024-07-04 ASSESSMENT — PAIN DESCRIPTION - LOCATION: LOCATION: HIP

## 2024-07-04 ASSESSMENT — COGNITIVE AND FUNCTIONAL STATUS - GENERAL
TOILETING: A LOT
DAILY ACTIVITIY SCORE: 14
DRESSING REGULAR LOWER BODY CLOTHING: A LOT
MOVING FROM LYING ON BACK TO SITTING ON SIDE OF FLAT BED WITH BEDRAILS: A LOT
MOVING FROM LYING ON BACK TO SITTING ON SIDE OF FLAT BED WITH BEDRAILS: A LOT
HELP NEEDED FOR BATHING: A LOT
TURNING FROM BACK TO SIDE WHILE IN FLAT BAD: A LOT
MOVING TO AND FROM BED TO CHAIR: A LOT
WALKING IN HOSPITAL ROOM: TOTAL
PATIENT BASELINE BEDBOUND: NO
DRESSING REGULAR LOWER BODY CLOTHING: A LOT
STANDING UP FROM CHAIR USING ARMS: TOTAL
WALKING IN HOSPITAL ROOM: TOTAL
CLIMB 3 TO 5 STEPS WITH RAILING: TOTAL
DRESSING REGULAR UPPER BODY CLOTHING: A LOT
CLIMB 3 TO 5 STEPS WITH RAILING: TOTAL
TOILETING: A LOT
MOVING TO AND FROM BED TO CHAIR: A LOT
HELP NEEDED FOR BATHING: A LOT
MOBILITY SCORE: 9
PERSONAL GROOMING: A LOT
DRESSING REGULAR UPPER BODY CLOTHING: A LOT
DAILY ACTIVITIY SCORE: 14
STANDING UP FROM CHAIR USING ARMS: TOTAL
MOBILITY SCORE: 9
PERSONAL GROOMING: A LOT
TURNING FROM BACK TO SIDE WHILE IN FLAT BAD: A LOT

## 2024-07-04 ASSESSMENT — PAIN DESCRIPTION - ORIENTATION: ORIENTATION: LEFT

## 2024-07-04 ASSESSMENT — PAIN - FUNCTIONAL ASSESSMENT: PAIN_FUNCTIONAL_ASSESSMENT: 0-10

## 2024-07-04 ASSESSMENT — ACTIVITIES OF DAILY LIVING (ADL)
TOILETING: NEEDS ASSISTANCE
PATIENT'S MEMORY ADEQUATE TO SAFELY COMPLETE DAILY ACTIVITIES?: YES
FEEDING YOURSELF: INDEPENDENT
JUDGMENT_ADEQUATE_SAFELY_COMPLETE_DAILY_ACTIVITIES: YES
HEARING - RIGHT EAR: FUNCTIONAL
ASSISTIVE_DEVICE: WHEELCHAIR
LACK_OF_TRANSPORTATION: NO
DRESSING YOURSELF: NEEDS ASSISTANCE
HEARING - LEFT EAR: FUNCTIONAL
ADEQUATE_TO_COMPLETE_ADL: YES
BATHING: NEEDS ASSISTANCE
WALKS IN HOME: NEEDS ASSISTANCE
GROOMING: NEEDS ASSISTANCE

## 2024-07-04 ASSESSMENT — ENCOUNTER SYMPTOMS
CONSTITUTIONAL NEGATIVE: 1
CARDIOVASCULAR NEGATIVE: 1
MUSCULOSKELETAL NEGATIVE: 1
SPEECH DIFFICULTY: 1
RESPIRATORY NEGATIVE: 1
GASTROINTESTINAL NEGATIVE: 1
PSYCHIATRIC NEGATIVE: 1
HEADACHES: 1

## 2024-07-04 ASSESSMENT — PATIENT HEALTH QUESTIONNAIRE - PHQ9
1. LITTLE INTEREST OR PLEASURE IN DOING THINGS: NOT AT ALL
SUM OF ALL RESPONSES TO PHQ9 QUESTIONS 1 & 2: 0
2. FEELING DOWN, DEPRESSED OR HOPELESS: NOT AT ALL

## 2024-07-04 ASSESSMENT — PAIN SCALES - GENERAL
PAINLEVEL_OUTOF10: 0 - NO PAIN
PAINLEVEL_OUTOF10: 1
PAINLEVEL_OUTOF10: 0 - NO PAIN

## 2024-07-04 ASSESSMENT — LIFESTYLE VARIABLES
SKIP TO QUESTIONS 9-10: 1
HOW OFTEN DO YOU HAVE A DRINK CONTAINING ALCOHOL: NEVER
SUBSTANCE_ABUSE_PAST_12_MONTHS: NO
HOW MANY STANDARD DRINKS CONTAINING ALCOHOL DO YOU HAVE ON A TYPICAL DAY: PATIENT DOES NOT DRINK
AUDIT-C TOTAL SCORE: 0
PRESCIPTION_ABUSE_PAST_12_MONTHS: NO
AUDIT-C TOTAL SCORE: 0
HOW OFTEN DO YOU HAVE 6 OR MORE DRINKS ON ONE OCCASION: NEVER

## 2024-07-04 NOTE — PROGRESS NOTES
Emergency Medicine Transition of Care Note.    I received Joselyn Coronado in signout from Dr. Coleman.  Please see the previous ED provider note for all HPI, PE and MDM up to the time of signout at 0600. This is in addition to the primary record.    In brief Joselyn Coronado is an 79 y.o. female presenting for   Chief Complaint   Patient presents with    Fall     At the time of signout we were awaiting: Discharge    ED Course as of 07/04/24 1213   Thu Jul 04, 2024   0250 Reevaluated after signout, overall feeling improved, updated with imaging and lab results.  Patient states that normally she is in a motorized wheelchair.  Will discharge the patient home with outpatient wound care and return precautions. [LP]      ED Course User Index  [LP] Gregoria Coleman,          Diagnoses as of 07/04/24 1213   Fall, initial encounter   Head injury, initial encounter   Laceration of forehead, initial encounter       Medical Decision Making  This is a 79-year-old female who presents to the emergency department after a fall last night.  She was evaluated with labs and imaging.  These were unremarkable.  The patient was discharged and awaiting a ride home at time of signout.  Prior to leaving the emergency department, however, the patient developed an episode of slurred speech.  I was called to the bedside at 0 840.  The patient had slurred speech but an otherwise normal neurologic exam.  Blood sugar was performed and was 104.  Repeat head CT was performed and was unchanged.  Her slurred speech resolved after 10 to 15 minutes.  Due to this, the patient's discharge was canceled.  She was admitted to the observation unit for further evaluation and management.        Final diagnoses:   [W19.XXXA] Fall, initial encounter   [S09.90XA] Head injury, initial encounter   [S01.81XA] Laceration of forehead, initial encounter           Procedure  Procedures    Neal Mack MD

## 2024-07-04 NOTE — PROGRESS NOTES
I received Joselyn Coronado in signout from Dr. Mahmood.  Please see the previous note for all HPI, PE and MDM up to the time of signout at 0130.    In brief Joselyn Coronado is an 79 y.o. female presenting for   Chief Complaint   Patient presents with    Fall   .  At the time of signout we were awaiting: labs and reeval    ED Course as of 07/04/24 0251   Thu Jul 04, 2024   0250 Reevaluated after signout, overall feeling improved, updated with imaging and lab results.  Patient states that normally she is in a motorized wheelchair.  Will discharge the patient home with outpatient wound care and return precautions. [LP]      ED Course User Index  [LP] Gregoria Coleman DO         Diagnoses as of 07/04/24 0251   Fall, initial encounter   Head injury, initial encounter   Laceration of forehead, initial encounter       Pt Disposition: discharge    Procedures    Gregoria Coleman DO  Emergency Medicine  Medical Toxicology

## 2024-07-04 NOTE — PROGRESS NOTES
Pharmacy Medication History Review   Spoke to the patient, Pt stated she doesn't take Protonix anymore.  Pt is on Keppra and takes Warfarin daily, Went by the last INR schedule, pt wasn't sure.    Joselyn Coronado is a 79 y.o. female admitted for Expressive aphasia. Pharmacy reviewed the patient's [id number] medications and allergies for accuracy.    The list below reflectives the updated PTA list. Please review each medication in order reconciliation for additional clarification and justification.     Prior to Admission Medications   Prescriptions Last Dose Informant   DULoxetine (Cymbalta) 30 mg DR capsule 7/2/2024    Sig: Take 1 capsule (30 mg) by mouth once daily. Do not crush or chew.   lamoTRIgine (LaMICtal) 150 mg tablet 7/2/2024    Sig: Take 1 tablet (150 mg) by mouth once daily.   levETIRAcetam (Keppra) 250 mg tablet 7/2/2024    Sig: Take 1 tablet (250 mg) by mouth once daily.   mirtazapine (Remeron) 15 mg tablet     Sig: Take 1 tablet (15 mg) by mouth as needed at bedtime.   warfarin (Coumadin) 2 mg tablet     Sig: Take 1 tablet (2 mg) by mouth 3 times a week. Wed/ Fri/ Sat   warfarin (Coumadin) 2 mg tablet     Sig: Take 1.5 tablets (3 mg) by mouth 4 times a week. Sun/ Mon/ Tues/ Thurs      Facility-Administered Medications: None       The list below reflectives the updated allergy list. Please review each documented allergy for additional clarification and justification.  Allergies  Reviewed by Essence Mills RN on 7/3/2024        Severity Reactions Comments    Adhesive Low Rash Rash    Penicillins Low Rash             Below are additional concerns with the patient's PTA list.      Kelsey Yuen

## 2024-07-04 NOTE — NURSING NOTE
Patient arrived to the unit from the ED. Patient provided discharge folder and educated on the unit and fall prevention. Patient verbalized understanding.

## 2024-07-04 NOTE — ED TRIAGE NOTES
Patient states she was getting out of bed to use restroom when she fell and hit her head on the dresser. Patient on coumadin for previous stroke.

## 2024-07-04 NOTE — H&P
"History Of Present Illness  Joselyn Coronado is a 79 y.o. female presenting with fall.  Past Medical History:   Diagnosis Date    Aortic stenosis     Bipolar disorder (Multi)     History of DVT (deep vein thrombosis)     History of stroke     History of subdural hematoma     PAD (peripheral artery disease) (CMS-[facility])     Seizure disorder (Multi)     Vascular dementia (Multi)      When I saw Joselyn in ED, she was having expressive aphasia, mixing up her words, so that I was not able to get a clear story from her of why she was here. She did appear to be oriented. She had a mechanical fall at home last night, where her legs gave out, and she fell and hit her head (no LOC). She has an unsteady gait at baseline due to hx of stroke and femur fracture. She is on coumadin, INR 2.4 here. Work-up in ED including CT head was unremarkable. They were working to get her back home this morning, when she developed aphasia. She apparently has a history of expressive aphasia, but was not having it when she got to ED. She does not appear to be on asa or statin.      History reviewed. No pertinent surgical history.  Social History     Tobacco Use    Smoking status: Never    Smokeless tobacco: Never   Substance Use Topics    Alcohol use: Not Currently    Drug use: Not Currently     No family history on file.  Allergies  Adhesive and Penicillins    Review of Systems   Constitutional: Negative.    HENT: Negative.     Respiratory: Negative.     Cardiovascular: Negative.    Gastrointestinal: Negative.    Genitourinary: Negative.    Musculoskeletal: Negative.    Skin: Negative.    Neurological:  Positive for speech difficulty and headaches.   Psychiatric/Behavioral: Negative.         Last Recorded Vitals  Blood pressure 138/80, pulse 95, temperature 36.3 °C (97.3 °F), temperature source Temporal, resp. rate 16, height 1.6 m (5' 3\"), weight 57.2 kg (126 lb), SpO2 97%.  Physical Exam  Cardiovascular:      Rate and Rhythm: Normal rate and " regular rhythm.      Heart sounds: Murmur heard.      Systolic murmur is present.   Pulmonary:      Breath sounds: Normal breath sounds.   Abdominal:      General: Bowel sounds are normal.      Palpations: Abdomen is soft.   Musculoskeletal:         General: Normal range of motion.   Neurological:      General: No focal deficit present.      Mental Status: She is alert and oriented to person, place, and time.      Cranial Nerves: Cranial nerves 2-12 are intact.      Sensory: Sensation is intact.      Motor: Motor function is intact.      Comments: Expressive aphasia   Psychiatric:         Mood and Affect: Mood normal.           Relevant Results           Assessment/Plan   Principal Problem:    Expressive aphasia  Active Problems:    Seizure disorder (Multi)    History of subdural hematoma    History of stroke    History of DVT (deep vein thrombosis)    Bipolar disorder (Multi)    PAD (peripheral artery disease) (CMS-[facility])    Aortic stenosis    Vascular dementia (Multi)    Fall  - MRI brain is negative for stroke  - her aphasia subsequently resolved; it was likely related to her head injury and she gets this intermittently  - will add statin (should be on this anyway) and continue coumadin  - discharge home today         I spent 55 minutes in the professional and overall care of this patient.      Tono Canchola MD

## 2024-07-04 NOTE — CARE PLAN
The patient's goals for the shift include injury free    The clinical goals for the shift include free from falls    Over the shift, the patient did not make progress toward the following goals. Barriers to progression include weakness. Recommendations to address these barriers include increase safety.

## 2024-07-04 NOTE — ED PROVIDER NOTES
HPI   Chief Complaint   Patient presents with    Fall       HPI  Patient is a 79-year-old female who presented to the emergency room as a trauma activation after mechanical fall while thinners.  Patient states that she has a prior history of a CVA with a chronic left-sided upper and lower extremity weakness as well as a contralateral right femur fracture which makes her unsteady.  She states that her left lower extremity caved and she fell onto her dresser and nightstand.  She did strike her head but denies any loss of consciousness.  She has no neck pain, new neurologic deficits, nausea, vomiting, visual field cuts.  She has a chronic shortening of her right lower extremity secondary to her femur fracture and repair.  She has no hip pain at this time.  Denies any back pain or other issues.  She was not feeling any symptoms prior to the fall and was at her baseline.  She has been taking her Coumadin.  Of note, she has chronic lower extremity swelling on the left which has been evaluated by her primary care physician including ultrasounds that did not show signs of DVT according to patient.      PMHx: As above  PSHx: As above  FamilyHx: Denies pertinent denies  SocialHx:  Allergies: Adhesives and penicillin  Medications: See Medication Reconciliation     ROS  As above otherwise    Physical Exam    GENERAL: Awake and Alert, No Acute Distress  HEENT: Mild superficial abrasion overlying a cephalohematoma on the left side of her forehead.  AT/NC, PERRL, EOMI, Normal Oropharynx, No Signs of Dehydration  NECK: Normal Inspection, No JVD  CARDIOVASCULAR: RRR, No M/R/G  RESPIRATORY: CTA Bilaterally, No Wheezes, Rales or Rhonchi, Chest Wall Non-tender  ABDOMEN: Soft, non-tender abdomen, Normal Bowel Sounds, No Distention  BACK: No CVA Tenderness  SKIN: Normal Color, Warm, Dry, No Rashes   EXTREMITIES: Right lower extremity slightly shortened compared to left.  Non-Tender, Full ROM, No Pedal Edema  NEURO: A&O x 3, Normal Motor  and Sensation, Normal Mood and Affect    Nursing Assessment and Vitals Reviewed    EG showed a normal sinus rhythm at 97 bpm.  There are T wave inversions in lead III.  No ischemic ST changes.    Medical Decision  Patient is a 79-year-old female who presented to the emergency room as a trauma activation after mechanical fall while thinners.  Patient states that she has a prior history of a CVA with a chronic left-sided upper and lower extremity weakness as well as a contralateral right femur fracture which makes her unsteady.  She states that her left lower extremity caved and she fell onto her dresser and nightstand.  She did strike her head but denies any loss of consciousness.  She has no neck pain, new neurologic deficits, nausea, vomiting, visual field cuts.  She has a chronic shortening of her right lower extremity secondary to her femur fracture and repair.  She has no hip pain at this time.  Denies any back pain or other issues.  She was not feeling any symptoms prior to the fall and was at her baseline.  She has been taking her Coumadin.  Of note, she has chronic lower extremity swelling on the left which has been evaluated by her primary care physician including ultrasounds that did not show signs of DVT according to patient.    On evaluation patient is well-appearing and in no acute distress with an abrasion to the forehead as described above which is hemostatic.  She has baseline left upper and lower extremity weakness.  She has a shortening of her right lower extremity secondary to previous surgery.  She has no C, T, L-spine tenderness and although she is neurologically intact will obtain CT imaging given anticoagulations date.  Well awaiting her CT patient started to complain of left hip pain which she did not originally have.  As such x-ray is ordered.    Workup for patient thus far included a CBC that showed no anemia or leukocytosis.  Urinalysis is within normal limits.  X-ray of the hip showed no  evidence of acute traumatic injury.  CT of the head and C-spine showed no acute intracranial pathology, skull fracture.  She has findings consistent with prior CVA.  CT C-spine showing a lot of chronic findings but no acute pathology.  She is signed out to oncoming physician pending reevaluation, CMP, coagulation screen.  Patient does have a small superficial laceration to forehead which is repaired with glue with great approximation. [employer] updated.                               No data recorded                   Patient History   History reviewed. No pertinent past medical history.  History reviewed. No pertinent surgical history.  No family history on file.  Social History     Tobacco Use    Smoking status: Never    Smokeless tobacco: Never   Substance Use Topics    Alcohol use: Not on file    Drug use: Not on file       Physical Exam   ED Triage Vitals [07/03/24 2340]   Temperature Heart Rate Respirations BP   37.1 °C (98.8 °F) (!) 102 20 120/68      Pulse Ox Temp Source Heart Rate Source Patient Position   96 % Temporal Monitor Lying      BP Location FiO2 (%)     Right arm --       Physical Exam    ED Course & MDM   Diagnoses as of 07/04/24 0147   Fall, initial encounter   Head injury, initial encounter   Laceration of forehead, initial encounter       Medical Decision Making      Procedure  Procedures     Nancy Posada MD  07/04/24 0147       Nancy Posada MD  07/04/24 0158

## 2024-12-18 ENCOUNTER — APPOINTMENT (OUTPATIENT)
Dept: RADIOLOGY | Facility: HOSPITAL | Age: 79
End: 2024-12-18
Payer: MEDICARE

## 2024-12-18 ENCOUNTER — HOSPITAL ENCOUNTER (EMERGENCY)
Facility: HOSPITAL | Age: 79
Discharge: HOME | End: 2024-12-19
Attending: EMERGENCY MEDICINE
Payer: MEDICARE

## 2024-12-18 DIAGNOSIS — S09.90XA CLOSED HEAD INJURY, INITIAL ENCOUNTER: Primary | ICD-10-CM

## 2024-12-18 PROCEDURE — 73502 X-RAY EXAM HIP UNI 2-3 VIEWS: CPT | Mod: RIGHT SIDE | Performed by: RADIOLOGY

## 2024-12-18 PROCEDURE — 73502 X-RAY EXAM HIP UNI 2-3 VIEWS: CPT | Mod: RT

## 2024-12-18 PROCEDURE — 70450 CT HEAD/BRAIN W/O DYE: CPT

## 2024-12-18 PROCEDURE — 93970 EXTREMITY STUDY: CPT

## 2024-12-18 PROCEDURE — 73590 X-RAY EXAM OF LOWER LEG: CPT | Mod: RT

## 2024-12-18 PROCEDURE — 72125 CT NECK SPINE W/O DYE: CPT

## 2024-12-18 PROCEDURE — 73590 X-RAY EXAM OF LOWER LEG: CPT | Mod: RIGHT SIDE | Performed by: RADIOLOGY

## 2024-12-18 PROCEDURE — 72125 CT NECK SPINE W/O DYE: CPT | Performed by: SURGERY

## 2024-12-18 PROCEDURE — 70450 CT HEAD/BRAIN W/O DYE: CPT | Performed by: SURGERY

## 2024-12-18 PROCEDURE — 93971 EXTREMITY STUDY: CPT | Mod: FOREIGN READ | Performed by: RADIOLOGY

## 2024-12-18 PROCEDURE — 99285 EMERGENCY DEPT VISIT HI MDM: CPT | Mod: 25 | Performed by: EMERGENCY MEDICINE

## 2024-12-18 RX ORDER — ACETAMINOPHEN 325 MG/1
975 TABLET ORAL ONCE AS NEEDED
Status: DISCONTINUED | OUTPATIENT
Start: 2024-12-18 | End: 2024-12-19 | Stop reason: HOSPADM

## 2024-12-18 ASSESSMENT — PAIN SCALES - GENERAL: PAINLEVEL_OUTOF10: 0 - NO PAIN

## 2024-12-18 ASSESSMENT — PAIN - FUNCTIONAL ASSESSMENT: PAIN_FUNCTIONAL_ASSESSMENT: 0-10

## 2024-12-19 ENCOUNTER — APPOINTMENT (OUTPATIENT)
Dept: CARDIOLOGY | Facility: HOSPITAL | Age: 79
End: 2024-12-19
Payer: MEDICARE

## 2024-12-19 VITALS
HEART RATE: 98 BPM | TEMPERATURE: 98.4 F | DIASTOLIC BLOOD PRESSURE: 65 MMHG | SYSTOLIC BLOOD PRESSURE: 130 MMHG | OXYGEN SATURATION: 94 % | RESPIRATION RATE: 18 BRPM

## 2024-12-19 LAB
ATRIAL RATE: 95 BPM
GLUCOSE BLD MANUAL STRIP-MCNC: 108 MG/DL (ref 74–99)
P AXIS: 54 DEGREES
P OFFSET: 197 MS
P ONSET: 148 MS
PR INTERVAL: 148 MS
Q ONSET: 222 MS
QRS COUNT: 15 BEATS
QRS DURATION: 68 MS
QT INTERVAL: 382 MS
QTC CALCULATION(BAZETT): 480 MS
QTC FREDERICIA: 445 MS
R AXIS: 15 DEGREES
T AXIS: 64 DEGREES
T OFFSET: 413 MS
VENTRICULAR RATE: 95 BPM

## 2024-12-19 PROCEDURE — 93005 ELECTROCARDIOGRAM TRACING: CPT

## 2024-12-19 PROCEDURE — 82947 ASSAY GLUCOSE BLOOD QUANT: CPT

## 2024-12-19 NOTE — ED PROVIDER NOTES
HPI   Chief Complaint   Patient presents with    Fall       HPI  Patient has a history of stroke with visual loss and weakness.  Use a walker.  History of mechanical fall today fell hit her head on a dresser also her right buttock and right shin.  She notes is that with her left leg foot drop she has had some edema there for the last several weeks a month      Patient History   Past Medical History:   Diagnosis Date    Aortic stenosis     Bipolar disorder (Multi)     History of DVT (deep vein thrombosis)     History of stroke     History of subdural hematoma     PAD (peripheral artery disease) (CMS-[facility])     Seizure disorder (Multi)     Vascular dementia (Multi)      No past surgical history on file.  No family history on file.  Social History     Tobacco Use    Smoking status: Never    Smokeless tobacco: Never   Substance Use Topics    Alcohol use: Not Currently    Drug use: Not Currently       Physical Exam   ED Triage Vitals   Temperature Heart Rate Respirations BP   12/18/24 2058 12/18/24 2056 12/18/24 2056 12/18/24 2056   36.9 °C (98.4 °F) 92 20 131/73      Pulse Ox Temp Source Heart Rate Source Patient Position   12/18/24 2056 12/18/24 2058 12/18/24 2056 12/18/24 2056   (!) 92 % Temporal Monitor Sitting      BP Location FiO2 (%)     12/18/24 2056 --     Right arm        Physical Exam  Vitals and nursing note reviewed.   Constitutional:       General: She is not in acute distress.     Appearance: She is well-developed.   HENT:      Head: Normocephalic and atraumatic.   Eyes:      Conjunctiva/sclera: Conjunctivae normal.   Cardiovascular:      Rate and Rhythm: Normal rate and regular rhythm.      Heart sounds: No murmur heard.  Pulmonary:      Effort: Pulmonary effort is normal. No respiratory distress.      Breath sounds: Normal breath sounds.   Abdominal:      Palpations: Abdomen is soft.      Tenderness: There is no abdominal tenderness.   Musculoskeletal:         General: No swelling.      Cervical back: Neck  supple.      Left lower leg: Edema present.      Comments: Abrasion 1 cm to the anterior to the distal one third of the tibia   Skin:     General: Skin is warm and dry.      Capillary Refill: Capillary refill takes less than 2 seconds.   Neurological:      Mental Status: She is alert.      Motor: Weakness present.      Comments: Chronic left arm and leg weakness left foot drop   Psychiatric:         Mood and Affect: Mood normal.           ED Course & MDM   Diagnoses as of 12/19/24 0102   Closed head injury, initial encounter                 No data recorded     [employer] Coma Scale Score: 15 (12/18/24 2056 : Margaret Miranda, RN)                           Medical Decision Making  The patient has some increasing edema of her left leg ongoing for greater than 1 month.  Denies any chest pain shortness of breath.  Denies a fever, cough, vomiting.  Absence of any new systemic symptoms would hold off on blood work at this time.  If she has no bleeding in the brain or blood clot feel she be discharged home follow-up from PCP.  I CTs are negative.  Patient was oriented x 3.  Had some confusion after being woken up with the results of negative CT scans but has normal glucose and EKG unremarkable.    EKG interpreted by myself.  Normal sinus rhythm at a rate of 95 bpm.  Normal intervals.  Normal axis.  No signs of acute ischemia.    Procedure  Procedures     Francisco Johnson MD  12/19/24 0106

## 2024-12-19 NOTE — ED TRIAGE NOTES
Biba from home after a mechanical fall and hitting back of head on dresser. Pt denies LOC but endorses use of coumadin for stroke in 1995. Pt denies pain to head. Laceration 1/2in noted on right ankle from hitting area on bed.

## 2024-12-31 ENCOUNTER — APPOINTMENT (OUTPATIENT)
Dept: RADIOLOGY | Facility: HOSPITAL | Age: 79
End: 2024-12-31
Payer: MEDICARE

## 2024-12-31 ENCOUNTER — HOSPITAL ENCOUNTER (EMERGENCY)
Facility: HOSPITAL | Age: 79
Discharge: HOME | End: 2025-01-01
Attending: GENERAL PRACTICE
Payer: MEDICARE
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
## 2024-12-31 LAB
ALBUMIN SERPL BCP-MCNC: 3.8 G/DL (ref 3.4–5)
ALP SERPL-CCNC: 112 U/L (ref 33–136)
ALT SERPL W P-5'-P-CCNC: 39 U/L (ref 7–45)
ANION GAP SERPL CALC-SCNC: 12 MMOL/L (ref 10–20)
APPEARANCE UR: CLEAR
AST SERPL W P-5'-P-CCNC: 36 U/L (ref 9–39)
BASOPHILS # BLD AUTO: 0.05 X10*3/UL (ref 0–0.1)
BASOPHILS NFR BLD AUTO: 0.4 %
BILIRUB SERPL-MCNC: 0.6 MG/DL (ref 0–1.2)
BILIRUB UR STRIP.AUTO-MCNC: NEGATIVE MG/DL
BUN SERPL-MCNC: 16 MG/DL (ref 6–23)
CALCIUM SERPL-MCNC: 8.6 MG/DL (ref 8.6–10.3)
CHLORIDE SERPL-SCNC: 99 MMOL/L (ref 98–107)
CO2 SERPL-SCNC: 25 MMOL/L (ref 21–32)
COLOR UR: COLORLESS
CREAT SERPL-MCNC: 0.78 MG/DL (ref 0.5–1.05)
EGFRCR SERPLBLD CKD-EPI 2021: 78 ML/MIN/1.73M*2
EOSINOPHIL # BLD AUTO: 0.07 X10*3/UL (ref 0–0.4)
EOSINOPHIL NFR BLD AUTO: 0.6 %
ERYTHROCYTE [DISTWIDTH] IN BLOOD BY AUTOMATED COUNT: 14.9 % (ref 11.5–14.5)
GLUCOSE SERPL-MCNC: 95 MG/DL (ref 74–99)
GLUCOSE UR STRIP.AUTO-MCNC: NORMAL MG/DL
HCT VFR BLD AUTO: 38.1 % (ref 36–46)
HGB BLD-MCNC: 12.2 G/DL (ref 12–16)
IMM GRANULOCYTES # BLD AUTO: 0.05 X10*3/UL (ref 0–0.5)
IMM GRANULOCYTES NFR BLD AUTO: 0.4 % (ref 0–0.9)
KETONES UR STRIP.AUTO-MCNC: NEGATIVE MG/DL
LEUKOCYTE ESTERASE UR QL STRIP.AUTO: NEGATIVE
LYMPHOCYTES # BLD AUTO: 2.47 X10*3/UL (ref 0.8–3)
LYMPHOCYTES NFR BLD AUTO: 19.4 %
MAGNESIUM SERPL-MCNC: 1.78 MG/DL (ref 1.6–2.4)
MCH RBC QN AUTO: 26.9 PG (ref 26–34)
MCHC RBC AUTO-ENTMCNC: 32 G/DL (ref 32–36)
MCV RBC AUTO: 84 FL (ref 80–100)
MONOCYTES # BLD AUTO: 0.9 X10*3/UL (ref 0.05–0.8)
MONOCYTES NFR BLD AUTO: 7.1 %
NEUTROPHILS # BLD AUTO: 9.16 X10*3/UL (ref 1.6–5.5)
NEUTROPHILS NFR BLD AUTO: 72.1 %
NITRITE UR QL STRIP.AUTO: NEGATIVE
NRBC BLD-RTO: 0 /100 WBCS (ref 0–0)
PH UR STRIP.AUTO: 7.5 [PH]
PLATELET # BLD AUTO: 282 X10*3/UL (ref 150–450)
POTASSIUM SERPL-SCNC: 4.2 MMOL/L (ref 3.5–5.3)
PROT SERPL-MCNC: 6.9 G/DL (ref 6.4–8.2)
PROT UR STRIP.AUTO-MCNC: NEGATIVE MG/DL
RBC # BLD AUTO: 4.53 X10*6/UL (ref 4–5.2)
RBC # UR STRIP.AUTO: NEGATIVE /UL
SODIUM SERPL-SCNC: 132 MMOL/L (ref 136–145)
SP GR UR STRIP.AUTO: 1.01
UROBILINOGEN UR STRIP.AUTO-MCNC: NORMAL MG/DL
WBC # BLD AUTO: 12.7 X10*3/UL (ref 4.4–11.3)

## 2024-12-31 PROCEDURE — 72125 CT NECK SPINE W/O DYE: CPT | Performed by: RADIOLOGY

## 2024-12-31 PROCEDURE — 70450 CT HEAD/BRAIN W/O DYE: CPT | Performed by: RADIOLOGY

## 2024-12-31 PROCEDURE — 85025 COMPLETE CBC W/AUTO DIFF WBC: CPT | Performed by: GENERAL PRACTICE

## 2024-12-31 PROCEDURE — 81003 URINALYSIS AUTO W/O SCOPE: CPT | Performed by: GENERAL PRACTICE

## 2024-12-31 PROCEDURE — 83735 ASSAY OF MAGNESIUM: CPT | Performed by: GENERAL PRACTICE

## 2024-12-31 PROCEDURE — 36415 COLL VENOUS BLD VENIPUNCTURE: CPT | Performed by: GENERAL PRACTICE

## 2024-12-31 PROCEDURE — 72125 CT NECK SPINE W/O DYE: CPT

## 2024-12-31 PROCEDURE — 80053 COMPREHEN METABOLIC PANEL: CPT | Performed by: GENERAL PRACTICE

## 2024-12-31 PROCEDURE — 70450 CT HEAD/BRAIN W/O DYE: CPT

## 2024-12-31 PROCEDURE — 99284 EMERGENCY DEPT VISIT MOD MDM: CPT | Mod: 25 | Performed by: GENERAL PRACTICE

## 2024-12-31 RX ORDER — PANTOPRAZOLE SODIUM 40 MG/1
40 TABLET, DELAYED RELEASE ORAL 2 TIMES DAILY
COMMUNITY

## 2024-12-31 RX ORDER — OLANZAPINE 2.5 MG/1
1 TABLET ORAL 2 TIMES DAILY
COMMUNITY

## 2024-12-31 RX ORDER — ATORVASTATIN CALCIUM 10 MG/1
10 TABLET, FILM COATED ORAL DAILY
COMMUNITY

## 2024-12-31 ASSESSMENT — ACTIVITIES OF DAILY LIVING (ADL): LACK_OF_TRANSPORTATION: NO

## 2024-12-31 ASSESSMENT — PAIN SCALES - GENERAL
PAINLEVEL_OUTOF10: 0 - NO PAIN

## 2024-12-31 ASSESSMENT — PAIN DESCRIPTION - PROGRESSION: CLINICAL_PROGRESSION: NOT CHANGED

## 2024-12-31 ASSESSMENT — PAIN - FUNCTIONAL ASSESSMENT: PAIN_FUNCTIONAL_ASSESSMENT: 0-10

## 2024-12-31 NOTE — PROGRESS NOTES
Pharmacy Medication History     Source of Information: Per pharmacy. Tried to talk to patient but she says she isn't sure and couldn't get a hold of .     Additional concerns with the patient's PTA list.   Did see recent visit to coumadin clinic. However [facility] hasn't filled coumadin since October 2024. Not sure where getting. Also the pharmacy did get a new order for zyprexa but it hasn't been picked up yet. It is for 10mg to take 1 tab twice daily for 3 days then 1 tablet daily thereafter.     The following updates were made to the Prior to Admission medication list:     Medications ADDED:   Olanzapine   Pantoprazole   Medications CHANGED:  Not taking lipitor 40mg now on 10mg daily   Medications REMOVED:   N/a  Medications NOT TAKING:   N/a     Allergy reviewed : Yes    Meds 2 Beds : Yes    Outpatient pharmacy confirmed and updated in chart : Yes    Pharmacy name: [facility]     The list below reflectives the updated PTA list. Please review each medication in order reconciliation for additional clarification and justification.    Prior to Admission Medications   Prescriptions Last Dose Informant   DULoxetine (Cymbalta) 30 mg DR capsule 12/30/2024    Sig: Take 1 capsule (30 mg) by mouth once daily in the morning. Do not crush or chew.   OLANZapine (ZyPREXA) 2.5 mg tablet 12/30/2024    Sig: Take 1 tablet (2.5 mg) by mouth 2 times a day.   acetaminophen (Tylenol) 325 mg tablet     Sig: Take 2 tablets (650 mg) by mouth every 6 hours if needed for mild pain (1 - 3).   atorvastatin (Lipitor) 10 mg tablet 12/30/2024    Sig: Take 1 tablet (10 mg) by mouth once daily.              lamoTRIgine (LaMICtal) 150 mg tablet 12/30/2024    Sig: Take 1 tablet (150 mg) by mouth once daily in the morning.   levETIRAcetam (Keppra) 250 mg tablet 12/30/2024    Sig: Take 1 tablet (250 mg) by mouth once daily in the evening. At 5pm   mirtazapine (Remeron) 15 mg tablet 12/30/2024    Sig: Take 1 tablet (15 mg) by mouth  once daily at bedtime. At 5pm   pantoprazole (ProtoNix) 40 mg EC tablet 12/30/2024    Sig: Take 1 tablet (40 mg) by mouth 2 times a day. Do not crush, chew, or split.   warfarin (Coumadin) 2 mg tablet     Sig: Take 1 tablet (2 mg) by mouth 4 times a week. Mon , tues, thurs , sat   warfarin (Coumadin) 2 mg tablet     Sig: Take 1.5 tablets (3 mg) by mouth 3 times a week. Sun, weds, fri      Facility-Administered Medications: None       The list below reflectives the updated allergy list. Please review each documented allergy for additional clarification and justification.    Allergies   Allergen Reactions    Adhesive Rash     Rash    Penicillins Rash          12/31/24 at 11:11 AM - Freya Jules

## 2024-12-31 NOTE — ED PROVIDER NOTES
HPI   Chief Complaint   Patient presents with    Fall    Weakness, Gen       HPI: 79-year-old female with a history of CVA with residual left-sided weakness presents following a fall.  She reportedly was going to the bathroom in the middle of the night when she fell in her bedroom onto her rear end.  She has no complaints.  EMS was called by her .  She reportedly has had several falls over the past several weeks and is denying headache, change in vision, pain in her extremities, chest pain and abdominal pain.      Limitations to history: None  Independent Historians: Patient, EMS  External Records Reviewed: HIE, outpatient notes, inpatient notes  ------------------------------------------------------------------------------------------------------------------------------------------  ROS: a ten point review of systems was performed and was negative except as per HPI.  ------------------------------------------------------------------------------------------------------------------------------------------  PMH / PSH: as per HPI, otherwise reviewed in EMR  MEDS: as per HPI, otherwise reviewed in EMR  ALLERGIES: as per HPI, otherwise reviewed in EMR  SocH:  as per HPI, otherwise reviewed in EMR  FH:  as per HPI, otherwise reviewed in EMR  ------------------------------------------------------------------------------------------------------------------------------------------  Physical Exam:  VS: As documented in the triage note and EMR flowsheet from this visit was reviewed  General: Well appearing. No acute distress.   Eyes:  Extraocular movements grossly intact. No scleral icterus. No discharge  HEENT:  Normocephalic.  Atraumatic  Neck: Moves neck freely. No gross masses  CV: Regular rhythm. No murmurs, rubs or gallops   Resp: Clear to auscultation bilaterally. No respiratory distress.    GI: Soft, no masses, nontender. No rebound tenderness or guarding  MSK: Symmetric muscle bulk. No deformities. No lower  extremity edema.    Skin: Warm, dry, intact.   Neuro: No focal deficits.  A&O x3.   Psych: Appropriate for situation  ------------------------------------------------------------------------------------------------------------------------------------------  Hospital Course / Medical Decision Making:  Independent Interpretations: CT head, C-spine  EKG as interpreted by me: N/A    MDM: 79-year-old female with a history of CVA with residual left-sided weakness and dementia presents following a fall.  She does have a history of dementia but is currently alert and oriented on my exam.  She reportedly does have a history of frequent falls.  She denies any chest pain and has no complaints currently.  No major abnormalities on CBC or CMP.  Urinalysis is negative for UTI.  CT of the brain shows no acute intracranial process.  No cervical spine fracture noted.  The patient adamantly wants to go home and currently lives in assisted living with her .  She will return to the ER for any additional falls or any other concerns.    Discussion of Management with Other Providers:   I discussed the patient/results with: Emergency medicine team    Final diagnosis and disposition as below.    Labs Reviewed  CBC WITH AUTO DIFFERENTIAL - Abnormal     WBC                           12.7 (*)               nRBC                          0.0                    RBC                           4.53                   Hemoglobin                    12.2                   Hematocrit                    38.1                   MCV                           84                     MCH                           26.9                   MCHC                          32.0                   RDW                           14.9 (*)               Platelets                     282                    Neutrophils %                 72.1                   Immature Granulocytes %, Automated   0.4                    Lymphocytes %                 19.4                    Monocytes %                   7.1                    Eosinophils %                 0.6                    Basophils %                   0.4                    Neutrophils Absolute          9.16 (*)               Immature Granulocytes Absolute, Au*   0.05                   Lymphocytes Absolute          2.47                   Monocytes Absolute            0.90 (*)               Eosinophils Absolute          0.07                   Basophils Absolute            0.05                COMPREHENSIVE METABOLIC PANEL - Abnormal     Glucose                       95                     Sodium                        132 (*)                Potassium                     4.2                    Chloride                      99                     Bicarbonate                   25                     Anion Gap                     12                     Urea Nitrogen                 16                     Creatinine                    0.78                   eGFR                          78                     Calcium                       8.6                    Albumin                       3.8                    Alkaline Phosphatase          112                    Total Protein                 6.9                    AST                           36                     Bilirubin, Total              0.6                    ALT                           39                  URINALYSIS WITH REFLEX CULTURE AND MICROSCOPIC - Abnormal     Color, Urine                  Colorless (*)               Appearance, Urine             Clear                  Specific Gravity, Urine       1.006                  pH, Urine                     7.5                    Protein, Urine                NEGATIVE                Glucose, Urine                Normal                 Blood, Urine                  NEGATIVE                Ketones, Urine                NEGATIVE                Bilirubin, Urine              NEGATIVE                Urobilinogen, Urine            Normal                 Nitrite, Urine                NEGATIVE                Leukocyte Esterase, Urine     NEGATIVE             MAGNESIUM - Normal     Magnesium                     1.78                URINALYSIS WITH REFLEX CULTURE AND MICROSCOPIC         Narrative: The following orders were created for panel order Urinalysis with Reflex Culture and Microscopic.                  Procedure                               Abnormality         Status                                     ---------                               -----------         ------                                     Urinalysis with Reflex C...[[id number]]  Abnormal            Final result                               Extra Urine Gray Tube[[id number]]                                                                                         Please view results for these tests on the individual orders.  EXTRA URINE GRAY TUBE    CT head wo IV contrast   Final Result    Age related degenerative change as described without acute findings    or significant change from the prior exam.          Signed by: Jeronimo Figueredo 12/31/2024 2:58 PM    Dictation workstation:   [id number]     CT cervical spine wo IV contrast   Final Result    Moderate spondylosis. Otherwise no acute findings or significant    interval change.          Signed by: Jeronimo Figueredo 12/31/2024 2:18 PM    Dictation workstation:   [id number]                       Patient History   Past Medical History:   Diagnosis Date    Aortic stenosis     Bipolar disorder     History of DVT (deep vein thrombosis)     History of stroke     History of subdural hematoma     PAD (peripheral artery disease) (CMS-[facility])     Seizure disorder (Multi)     Vascular dementia      History reviewed. No pertinent surgical history.  No family history on file.  Social History     Tobacco Use    Smoking status: Never    Smokeless tobacco: Never   Substance Use Topics    Alcohol use: Not Currently    Drug use: Not Currently        Physical Exam   ED Triage Vitals [12/31/24 0704]   Temperature Heart Rate Respirations BP   37.4 °C (99.4 °F) 97 18 145/74      Pulse Ox Temp Source Heart Rate Source Patient Position   (!) 93 % Oral Monitor Sitting      BP Location FiO2 (%)     Right arm --       Physical Exam      ED Course & MDM   Diagnoses as of 01/03/25 1534   Fall, initial encounter                 No data recorded     [place] Coma Scale Score: 14 (12/31/24 0706 : Jeff Masterson, LAURA)                           Medical Decision Making      Procedure  Procedures     Jimi Goodwin,   01/03/25 1539

## 2024-12-31 NOTE — ED TRIAGE NOTES
Pt BIBA from home c/o increase generalized weakness after 2 fall this morning. Pt has hx of a stroke with visual loss, left sided weakness, and generalized weakness and her left leg hardly works and pt uses a walker. Pt is on thinners. Pt denies LOC and hitting her head. Pt states that she slid out of bed. The second fall happened when the pt was trying to go to the bathroom. Pt stating that since her left leg is not working her right leg seems to be giving out. Pt is A&Ox3 pt a bit confused/sluggish on time. Pt reports no pain at this time

## 2024-12-31 NOTE — PROGRESS NOTES
Lives @ [facility] with  who also dementia per daughter  [address] [place] [address]  [phone]    Multiple messages left for [facility] to confirm level of care & assistance patient receives there. Awaiting return call.     @ 1400 Daughter Vickie Overton [phone] updated on plan of care. Confirmed private aide comes into apartment for the couple twice a day daily for ADL & medication assistance. Plans to transition parents to AL in the next month.      12/31/24 1302   Discharge Planning   Living Arrangements Spouse/significant other   Support Systems Spouse/significant other   Assistance Needed pain management   Type of Residence Assisted living   Care Facility Name Lives @ [facility] with   [address] [place], [place] [id number] [phone]   Home or Post Acute Services Post acute facilities (Rehab/SNF/etc)   Type of Post Acute Facility Services Assisted living   Expected Discharge Disposition Home   Does the patient need discharge transport arranged? Yes   RoundTrip coordination needed? Yes   Has discharge transport been arranged? No   Financial Resource Strain   How hard is it for you to pay for the very basics like food, housing, medical care, and heating? Not hard   Housing Stability   In the last 12 months, was there a time when you were not able to pay the mortgage or rent on time? N   In the past 12 months, how many times have you moved where you were living? 1   At any time in the past 12 months, were you homeless or living in a shelter (including now)? N   Transportation Needs   In the past 12 months, has lack of transportation kept you from medical appointments or from getting medications? no   In the past 12 months, has lack of transportation kept you from meetings, work, or from getting things needed for daily living? No   Patient Choice   Provider Choice list and CMS website (https://medicare.gov/care-compare#search) for post-acute Quality and Resource Measure  Data were provided and reviewed with: Patient   Patient / Family choosing to utilize agency / facility established prior to hospitalization Yes   Stroke Family Assessment   Stroke Family Assessment Needed No   Intensity of Service   Intensity of Service 0-30 min

## 2024-12-31 NOTE — PROGRESS NOTES
12/31/24 1144   ACS Disability Status   Are you deaf or do you have serious difficulty hearing? N   Are you blind or do you have serious difficulty seeing, even when wearing glasses? Y  (vision impaired)   Because of a physical, mental, or emotional condition, do you have serious difficulty concentrating, remembering, or making decisions? (5 years old or older) Y  (hx of CVA, bipolar, dementia)   Do you have serious difficulty walking or climbing stairs? Y  (left sided weakness with frequent falls)   Do you have serious difficulty dressing or bathing? Y   Because of a physical, mental, or emotional condition, do you have serious difficulty doing errands alone such as visiting the doctor? Y

## 2025-01-01 VITALS
WEIGHT: 125 LBS | TEMPERATURE: 98.9 F | DIASTOLIC BLOOD PRESSURE: 71 MMHG | HEART RATE: 70 BPM | SYSTOLIC BLOOD PRESSURE: 130 MMHG | OXYGEN SATURATION: 96 % | RESPIRATION RATE: 16 BRPM | BODY MASS INDEX: 23 KG/M2 | HEIGHT: 62 IN

## 2025-01-01 ASSESSMENT — PAIN SCALES - GENERAL: PAINLEVEL_OUTOF10: 0 - NO PAIN

## 2025-05-25 ENCOUNTER — HOSPITAL ENCOUNTER (EMERGENCY)
Facility: HOSPITAL | Age: 80
Discharge: HOME | End: 2025-05-25
Attending: EMERGENCY MEDICINE
Payer: MEDICARE

## 2025-05-25 ENCOUNTER — APPOINTMENT (OUTPATIENT)
Dept: CARDIOLOGY | Facility: HOSPITAL | Age: 80
End: 2025-05-25
Payer: MEDICARE

## 2025-05-25 ENCOUNTER — APPOINTMENT (OUTPATIENT)
Dept: RADIOLOGY | Facility: HOSPITAL | Age: 80
End: 2025-05-25
Payer: MEDICARE

## 2025-05-25 VITALS
WEIGHT: 136.24 LBS | BODY MASS INDEX: 24.92 KG/M2 | TEMPERATURE: 98.4 F | DIASTOLIC BLOOD PRESSURE: 65 MMHG | SYSTOLIC BLOOD PRESSURE: 136 MMHG | OXYGEN SATURATION: 93 % | HEART RATE: 104 BPM | RESPIRATION RATE: 28 BRPM

## 2025-05-25 DIAGNOSIS — K20.90 ESOPHAGITIS: ICD-10-CM

## 2025-05-25 DIAGNOSIS — R07.9 CHEST PAIN, UNSPECIFIED TYPE: Primary | ICD-10-CM

## 2025-05-25 LAB
ALBUMIN SERPL BCP-MCNC: 4.1 G/DL (ref 3.4–5)
ALP SERPL-CCNC: 97 U/L (ref 33–136)
ALT SERPL W P-5'-P-CCNC: 23 U/L (ref 7–45)
ANION GAP SERPL CALC-SCNC: 14 MMOL/L (ref 10–20)
APTT PPP: 31 SECONDS (ref 26–36)
AST SERPL W P-5'-P-CCNC: 22 U/L (ref 9–39)
BASOPHILS # BLD AUTO: 0.04 X10*3/UL (ref 0–0.1)
BASOPHILS NFR BLD AUTO: 0.4 %
BILIRUB SERPL-MCNC: 0.3 MG/DL (ref 0–1.2)
BUN SERPL-MCNC: 20 MG/DL (ref 6–23)
CALCIUM SERPL-MCNC: 9.1 MG/DL (ref 8.6–10.3)
CARDIAC TROPONIN I PNL SERPL HS: 8 NG/L (ref 0–13)
CARDIAC TROPONIN I PNL SERPL HS: 8 NG/L (ref 0–13)
CHLORIDE SERPL-SCNC: 107 MMOL/L (ref 98–107)
CO2 SERPL-SCNC: 24 MMOL/L (ref 21–32)
CREAT SERPL-MCNC: 1.01 MG/DL (ref 0.5–1.05)
D DIMER PPP FEU-MCNC: 875 NG/ML FEU
EGFRCR SERPLBLD CKD-EPI 2021: 57 ML/MIN/1.73M*2
EOSINOPHIL # BLD AUTO: 0.17 X10*3/UL (ref 0–0.4)
EOSINOPHIL NFR BLD AUTO: 1.6 %
ERYTHROCYTE [DISTWIDTH] IN BLOOD BY AUTOMATED COUNT: 16.1 % (ref 11.5–14.5)
GLUCOSE SERPL-MCNC: 102 MG/DL (ref 74–99)
HCT VFR BLD AUTO: 38.2 % (ref 36–46)
HGB BLD-MCNC: 12.1 G/DL (ref 12–16)
IMM GRANULOCYTES # BLD AUTO: 0.04 X10*3/UL (ref 0–0.5)
IMM GRANULOCYTES NFR BLD AUTO: 0.4 % (ref 0–0.9)
INR PPP: 2.3 (ref 0.9–1.1)
LYMPHOCYTES # BLD AUTO: 2.85 X10*3/UL (ref 0.8–3)
LYMPHOCYTES NFR BLD AUTO: 27.2 %
MCH RBC QN AUTO: 26.4 PG (ref 26–34)
MCHC RBC AUTO-ENTMCNC: 31.7 G/DL (ref 32–36)
MCV RBC AUTO: 83 FL (ref 80–100)
MONOCYTES # BLD AUTO: 0.75 X10*3/UL (ref 0.05–0.8)
MONOCYTES NFR BLD AUTO: 7.1 %
NEUTROPHILS # BLD AUTO: 6.64 X10*3/UL (ref 1.6–5.5)
NEUTROPHILS NFR BLD AUTO: 63.3 %
NRBC BLD-RTO: 0 /100 WBCS (ref 0–0)
PLATELET # BLD AUTO: 263 X10*3/UL (ref 150–450)
POTASSIUM SERPL-SCNC: 4.4 MMOL/L (ref 3.5–5.3)
PROT SERPL-MCNC: 6.8 G/DL (ref 6.4–8.2)
PROTHROMBIN TIME: 26 SECONDS (ref 9.8–12.4)
RBC # BLD AUTO: 4.58 X10*6/UL (ref 4–5.2)
SODIUM SERPL-SCNC: 141 MMOL/L (ref 136–145)
WBC # BLD AUTO: 10.5 X10*3/UL (ref 4.4–11.3)

## 2025-05-25 PROCEDURE — 93005 ELECTROCARDIOGRAM TRACING: CPT

## 2025-05-25 PROCEDURE — 71046 X-RAY EXAM CHEST 2 VIEWS: CPT

## 2025-05-25 PROCEDURE — 84484 ASSAY OF TROPONIN QUANT: CPT | Performed by: EMERGENCY MEDICINE

## 2025-05-25 PROCEDURE — 2500000004 HC RX 250 GENERAL PHARMACY W/ HCPCS (ALT 636 FOR OP/ED): Performed by: EMERGENCY MEDICINE

## 2025-05-25 PROCEDURE — 96374 THER/PROPH/DIAG INJ IV PUSH: CPT | Mod: 59

## 2025-05-25 PROCEDURE — 99285 EMERGENCY DEPT VISIT HI MDM: CPT | Mod: 25 | Performed by: EMERGENCY MEDICINE

## 2025-05-25 PROCEDURE — 36415 COLL VENOUS BLD VENIPUNCTURE: CPT | Performed by: EMERGENCY MEDICINE

## 2025-05-25 PROCEDURE — 85025 COMPLETE CBC W/AUTO DIFF WBC: CPT | Performed by: EMERGENCY MEDICINE

## 2025-05-25 PROCEDURE — 71275 CT ANGIOGRAPHY CHEST: CPT

## 2025-05-25 PROCEDURE — 71046 X-RAY EXAM CHEST 2 VIEWS: CPT | Performed by: STUDENT IN AN ORGANIZED HEALTH CARE EDUCATION/TRAINING PROGRAM

## 2025-05-25 PROCEDURE — 85730 THROMBOPLASTIN TIME PARTIAL: CPT | Performed by: EMERGENCY MEDICINE

## 2025-05-25 PROCEDURE — 2550000001 HC RX 255 CONTRASTS: Performed by: EMERGENCY MEDICINE

## 2025-05-25 PROCEDURE — 71275 CT ANGIOGRAPHY CHEST: CPT | Mod: FOREIGN READ | Performed by: RADIOLOGY

## 2025-05-25 PROCEDURE — 85379 FIBRIN DEGRADATION QUANT: CPT | Performed by: EMERGENCY MEDICINE

## 2025-05-25 PROCEDURE — 80053 COMPREHEN METABOLIC PANEL: CPT | Performed by: EMERGENCY MEDICINE

## 2025-05-25 PROCEDURE — 85610 PROTHROMBIN TIME: CPT | Performed by: EMERGENCY MEDICINE

## 2025-05-25 PROCEDURE — 93971 EXTREMITY STUDY: CPT

## 2025-05-25 RX ORDER — PANTOPRAZOLE SODIUM 40 MG/10ML
40 INJECTION, POWDER, LYOPHILIZED, FOR SOLUTION INTRAVENOUS ONCE
Status: COMPLETED | OUTPATIENT
Start: 2025-05-25 | End: 2025-05-25

## 2025-05-25 RX ORDER — PANTOPRAZOLE SODIUM 20 MG/1
40 TABLET, DELAYED RELEASE ORAL DAILY
Qty: 40 TABLET | Refills: 0 | Status: SHIPPED | OUTPATIENT
Start: 2025-05-25 | End: 2025-05-27 | Stop reason: WASHOUT

## 2025-05-25 RX ADMIN — PANTOPRAZOLE SODIUM 40 MG: 40 INJECTION, POWDER, FOR SOLUTION INTRAVENOUS at 20:23

## 2025-05-25 RX ADMIN — IOHEXOL 75 ML: 350 INJECTION, SOLUTION INTRAVENOUS at 17:56

## 2025-05-25 ASSESSMENT — COLUMBIA-SUICIDE SEVERITY RATING SCALE - C-SSRS
2. HAVE YOU ACTUALLY HAD ANY THOUGHTS OF KILLING YOURSELF?: NO
6. HAVE YOU EVER DONE ANYTHING, STARTED TO DO ANYTHING, OR PREPARED TO DO ANYTHING TO END YOUR LIFE?: NO
1. IN THE PAST MONTH, HAVE YOU WISHED YOU WERE DEAD OR WISHED YOU COULD GO TO SLEEP AND NOT WAKE UP?: NO

## 2025-05-25 ASSESSMENT — PAIN DESCRIPTION - PROGRESSION: CLINICAL_PROGRESSION: GRADUALLY IMPROVING

## 2025-05-25 ASSESSMENT — PAIN SCALES - GENERAL: PAINLEVEL_OUTOF10: 5 - MODERATE PAIN

## 2025-05-25 ASSESSMENT — PAIN - FUNCTIONAL ASSESSMENT: PAIN_FUNCTIONAL_ASSESSMENT: 0-10

## 2025-05-25 NOTE — ED PROVIDER NOTES
"  Emergency Department Provider Note     HPI  Patient is a 80-year-old female with a past medical history significant for remote breast cancer in 1998, prior CVA currently on Coumadin with a recent subtherapeutic INR who presents emergency room with chief complaint of chest pain.  She has chest pain that comes and goes to the anterior aspect of her chest that feels like she is sometimes being \"punched \".  She denies any shortness of breath, fever, chills, cough.  She has left lower extremity swelling that comes and goes.  It has been now present for a couple of days.  She denies any other symptoms including fevers, nausea, vomiting, abdominal pain, new neurologic deficits.  Nothing makes the pain better or worse.  It is nonpleuritic.      PMHx: As above  PSHx: Denies pertinent  FamilyHx: Heart disease  SocialHx: Denies  Allergies: Penicillin  Medications: See Medication Reconciliation     ROS  As above otherwise denied      Physical Exam    GENERAL: Awake and Alert, No Acute Distress  HEENT: AT/NC, PERRL, EOMI, Normal Oropharynx, No Signs of Dehydration  NECK: Normal Inspection, No JVD  CARDIOVASCULAR: RRR, No M/R/G  RESPIRATORY: CTA Bilaterally, No Wheezes, Rales or Rhonchi, Chest Wall Non-tender  ABDOMEN: Soft, non-tender abdomen, Normal Bowel Sounds, No Distention  BACK: No CVA Tenderness  SKIN: Normal Color, Warm, Dry, No Rashes   EXTREMITIES: Non-Tender, Full ROM, mild left lower extremity pedal Edema  NEURO: A&O x 3, baseline motor and Sensation, Normal Mood and Affect    Nursing Assessment and Vitals Reviewed    EKG showed a normal sinus rhythm at 87 bpm.  There are no significant interval prolongations or ischemic ST changes.  There is no axis deviation.    Medical Decision  Patient is a 80-year-old female with a past medical history significant for remote breast cancer in 1998, prior CVA currently on Coumadin with a recent subtherapeutic INR who presents emergency room with chief complaint of chest pain.  " "She has chest pain that comes and goes to the anterior aspect of her chest that feels like she is sometimes being \"punched \".  She denies any shortness of breath, fever, chills, cough.  She has left lower extremity swelling that comes and goes.  It has been now present for a couple of days.  She denies any other symptoms including fevers, nausea, vomiting, abdominal pain, new neurologic deficits.  Nothing makes the pain better or worse.  It is nonpleuritic.    On evaluation patient is very well-appearing and in no acute distress.  Lungs are clear and heart is regular.  Abdomen is soft, nontender nondistended.  She has no chest wall tenderness to palpation.  She does have some mild left lower extremity pedal edema.  Will perform a workup to evaluate further for including but not limited to ACS.    Workup for patient included labs that did not reveal any emergent electrolyte imbalance.  She has 2 negative troponins.  INR is therapeutic but her D-dimer was elevated so CT angio PE was added.  She has no leukocytosis or anemia.  Chest x-ray showed no evidence of acute cardiopulmonary pathology.  CT angio PE showed moderate concentric wall thickening of the distal esophagus concerning for esophagitis or Renteria's esophagus.  Endoscopy was recommended.  Patient also had gallbladder sludge but no signs of acute cholecystitis and diverticulosis without diverticulitis.  Ultrasound did not show any signs of DVT.    On reevaluation patient is very well-appearing and in no acute distress.  She is without pain in the absence of any pain medication.  We have discussed all her results and they have also been included in her discharge printout.  She is instructed to take Protonix as she states that she currently does not take it although it does appear to be in her medication list.  We discussed restarting this medication and following up closely with both her primary care physician as well as gastroenterology for potential EGD.  " She is thoroughly educated on supportive care at home as well as signs and symptoms that should prompt an immediate turn to emergency room.    Nancy Posada MD  Emergency Medicine                                                            Nancy Posada MD  05/25/25 2012       Nancy Posada MD  05/25/25 2012

## 2025-05-26 NOTE — DISCHARGE INSTRUCTIONS
Below are the results of your CT imaging to discuss with your primary care physician.  It is important you schedule a close follow-up appointment with them.  Additionally, make sure to schedule close follow-up with GI because you will need an endoscopy soon.  Take medications as indicated for length of time instructed.  Return immediately if concerning symptoms, as discussed.    CT IMPRESSION:  Moderate concentric wall thickening of the distal esophagus which may indicate esophagitis or Renteria's esophagus.  Endoscopy is recommended for further evaluation.  No acute cardia pulmonary disease.  Question chronic changes of COPD.  Gallbladder sludge versus noncalcified stones.  Descending colonic diverticulosis.  Additional chronic changes as described.

## 2025-05-27 ENCOUNTER — OFFICE VISIT (OUTPATIENT)
Dept: GASTROENTEROLOGY | Facility: HOSPITAL | Age: 80
End: 2025-05-27
Payer: MEDICARE

## 2025-05-27 DIAGNOSIS — K20.90 ESOPHAGITIS: ICD-10-CM

## 2025-05-27 DIAGNOSIS — Z12.11 COLON CANCER SCREENING: Primary | ICD-10-CM

## 2025-05-27 LAB
ATRIAL RATE: 87 BPM
P AXIS: 47 DEGREES
P OFFSET: 192 MS
P ONSET: 145 MS
PR INTERVAL: 152 MS
Q ONSET: 221 MS
QRS COUNT: 15 BEATS
QRS DURATION: 66 MS
QT INTERVAL: 382 MS
QTC CALCULATION(BAZETT): 459 MS
QTC FREDERICIA: 432 MS
R AXIS: 8 DEGREES
T AXIS: 38 DEGREES
T OFFSET: 412 MS
VENTRICULAR RATE: 87 BPM

## 2025-05-27 PROCEDURE — 99214 OFFICE O/P EST MOD 30 MIN: CPT

## 2025-05-27 PROCEDURE — 99204 OFFICE O/P NEW MOD 45 MIN: CPT

## 2025-05-27 RX ORDER — PANTOPRAZOLE SODIUM 40 MG/1
40 TABLET, DELAYED RELEASE ORAL DAILY
Qty: 30 TABLET | Refills: 11 | Status: SHIPPED | OUTPATIENT
Start: 2025-05-27 | End: 2026-05-27

## 2025-05-27 RX ORDER — POLYETHYLENE GLYCOL 3350, SODIUM SULFATE ANHYDROUS, SODIUM BICARBONATE, SODIUM CHLORIDE, POTASSIUM CHLORIDE 236; 22.74; 6.74; 5.86; 2.97 G/4L; G/4L; G/4L; G/4L; G/4L
4000 POWDER, FOR SOLUTION ORAL ONCE
Qty: 4000 ML | Refills: 0 | Status: SHIPPED | OUTPATIENT
Start: 2025-05-27 | End: 2025-05-27

## 2025-05-27 ASSESSMENT — ENCOUNTER SYMPTOMS
CONSTIPATION: 0
DIARRHEA: 0
SHORTNESS OF BREATH: 0
ABDOMINAL PAIN: 0
RECTAL PAIN: 0
BLOOD IN STOOL: 0
APPETITE CHANGE: 0
CHILLS: 0
FEVER: 0
NAUSEA: 0
VOMITING: 0
TROUBLE SWALLOWING: 0
ANAL BLEEDING: 0
ABDOMINAL DISTENTION: 0
FATIGUE: 0
COUGH: 0

## 2025-05-27 NOTE — PATIENT INSTRUCTIONS
If you and your daughter decide, please schedule your upper endoscopy to further investigate abnormal CT scan findings in esophagus and (if you prefer) colonoscopy in a hospital setting. You will need a ride since this involves sedation.  I will send a bowel prep to your pharmacy.  Clear liquid diet the day before the procedure. Start the 1st part of the prep at 6 pm the night prior and the other half 5 hrs before the procedure.  Please hold Coumadin 5 days prior to your procedure.  Confirm with prescribing physician.    Please take Pantoprazole 30-60 minutes before a meal daily.  This is to help calm stomach acid.      
Eloped (saw a physician/midlevel provider but left without telling anyone)

## 2025-05-27 NOTE — ASSESSMENT & PLAN NOTE
Recommended EGD in hospital setting if she decides to pursue further investigation.  Recommend patient hold coumadin 5 days prior to procedure and will need to confirm with her prescribing physician  - start 40 mg protonix daily

## 2025-05-27 NOTE — PROGRESS NOTES
Subjective     History of Present Illness:   Joselyn Coronado is a 80 y.o. female with PMHx of RA, bipolar, vascular dementia, epilepsy, aortic stenosis, DVT on warfarin, CVA, breast cancer who presents to GI clinic for further evaluation chest pain    Today, patient here with .  States she had 3 days of chest pain radiating down her left arm constantly like someone was punching her out of nowhere.  It suddenly went away on it's own and she feels fine now.  Denies ever feeling this before and does not feel she has GERD.  Is unsure if she wants to do a procedure and wants to discuss with her daughter.    Denies NSAID use.  5/2025 CT showed esophagitis or Renteria's esophagus, diverticulosis  Denies constipation, diarrhea, dyspepsia, melena, hematochezia, dysphagia, unintentional weight loss    Denies ETOH, smoking, marijuana  Denies fxh GI cancer or IBD  Abdominal Surgeries: denies    Last colonoscopy 5/2008 for screening: Internal hemorrhoids.  Repeat 3 years  Denies  EGD           Past Medical History  As per HPI.     Social History  she  reports that she has never smoked. She has never used smokeless tobacco. She reports that she does not currently use alcohol. She reports that she does not currently use drugs.     Family History  her family history is not on file.     Review of Systems  Review of Systems   Constitutional:  Negative for appetite change, chills, fatigue and fever.   HENT:  Negative for trouble swallowing.    Respiratory:  Negative for cough and shortness of breath.    Gastrointestinal:  Negative for abdominal distention, abdominal pain, anal bleeding, blood in stool, constipation, diarrhea, nausea, rectal pain and vomiting.       Allergies  RX Allergies[1]    Medications  Current Outpatient Medications   Medication Instructions    acetaminophen (TYLENOL) 650 mg, oral, Every 6 hours PRN    atorvastatin (LIPITOR) 40 mg, oral, Nightly    atorvastatin (LIPITOR) 10 mg, Daily     DULoxetine (CYMBALTA) 30 mg, Every morning    lamoTRIgine (LAMICTAL) 150 mg, Every morning    levETIRAcetam (KEPPRA) 250 mg, Every evening    mirtazapine (REMERON) 15 mg, Nightly    OLANZapine (ZyPREXA) 2.5 mg tablet 1 tablet, 2 times daily    pantoprazole (PROTONIX) 40 mg, oral, Daily, Do not crush, chew, or split.    polyethylene glycol (Golytely) 236-22.74-6.74 -5.86 gram solution 4,000 mL, oral, Once, Drink 1/2 starting at 6:00 pm the night prior to the procedure and the other 1/2 5 hours prior to the procedure.    warfarin (COUMADIN) 2 mg, oral, 4 times weekly, Mon , tues, thurs , sat     warfarin (COUMADIN) 3 mg, oral, 3 times weekly, Sun, weds, fri         Objective   There were no vitals taken for this visit.   Physical Exam  Constitutional:       Appearance: Normal appearance. She is normal weight.   HENT:      Mouth/Throat:      Mouth: Mucous membranes are dry.      Pharynx: Oropharynx is clear.   Cardiovascular:      Rate and Rhythm: Normal rate and regular rhythm.      Heart sounds: Murmur heard.   Pulmonary:      Effort: Pulmonary effort is normal.      Breath sounds: Normal breath sounds. No wheezing or rhonchi.   Abdominal:      General: Abdomen is flat. Bowel sounds are normal. There is distension.      Palpations: Abdomen is soft. There is no hepatomegaly.      Tenderness: There is no abdominal tenderness. There is no guarding or rebound. Negative signs include Whitney's sign.      Hernia: No hernia is present.   Musculoskeletal:         General: Normal range of motion.   Skin:     General: Skin is warm and dry.   Neurological:      General: No focal deficit present.      Mental Status: She is alert. Mental status is at baseline. She is disoriented.   Psychiatric:         Mood and Affect: Mood normal.         Behavior: Behavior normal.           Lab Results   Component Value Date    WBC 10.5 05/25/2025    WBC 12.7 (H) 12/31/2024    WBC 6.8 07/04/2024    HGB 12.1 05/25/2025    HGB 12.2 12/31/2024     HGB 12.4 07/04/2024    HCT 38.2 05/25/2025    HCT 38.1 12/31/2024    HCT 40.5 07/04/2024     05/25/2025     12/31/2024     07/04/2024     Lab Results   Component Value Date     05/25/2025     (L) 12/31/2024     07/04/2024    K 4.4 05/25/2025    K 4.2 12/31/2024    K 4.2 07/04/2024     05/25/2025    CL 99 12/31/2024     07/04/2024    CO2 24 05/25/2025    CO2 25 12/31/2024    CO2 25 07/04/2024    BUN 20 05/25/2025    BUN 16 12/31/2024    BUN 21 07/04/2024    CREATININE 1.01 05/25/2025    CREATININE 0.78 12/31/2024    CREATININE 0.94 07/04/2024    CALCIUM 9.1 05/25/2025    CALCIUM 8.6 12/31/2024    CALCIUM 9.2 07/04/2024    PROT 6.8 05/25/2025    PROT 6.9 12/31/2024    PROT 6.5 07/04/2024    BILITOT 0.3 05/25/2025    BILITOT 0.6 12/31/2024    BILITOT 0.2 07/04/2024    ALKPHOS 97 05/25/2025    ALKPHOS 112 12/31/2024    ALKPHOS 109 07/04/2024    ALT 23 05/25/2025    ALT 39 12/31/2024    ALT 20 07/04/2024    AST 22 05/25/2025    AST 36 12/31/2024    AST 21 07/04/2024    GLUCOSE 102 (H) 05/25/2025    GLUCOSE 95 12/31/2024    GLUCOSE 118 (H) 07/04/2024           Joselyn Coronado is a 80 y.o. female who presents to GI clinic for esophagitis.    Esophagitis  Recommended EGD in hospital setting if she decides to pursue further investigation.  Recommend patient hold coumadin 5 days prior to procedure and will need to confirm with her prescribing physician  - start 40 mg protonix daily    Colon cancer screening  -ordered colonoscopy in hospital if patient would like further screenings         Maral Funes, APRN-CNP              [1]   Allergies  Allergen Reactions    Adhesive Rash     Rash    Penicillins Rash